# Patient Record
Sex: FEMALE | Race: ASIAN | NOT HISPANIC OR LATINO | Employment: FULL TIME | URBAN - METROPOLITAN AREA
[De-identification: names, ages, dates, MRNs, and addresses within clinical notes are randomized per-mention and may not be internally consistent; named-entity substitution may affect disease eponyms.]

---

## 2017-04-06 ENCOUNTER — TELEPHONE (OUTPATIENT)
Dept: OBGYN | Facility: CLINIC | Age: 27
End: 2017-04-06

## 2017-04-06 NOTE — TELEPHONE ENCOUNTER
Pt is past due for f/u pap smear.  Reminder letter was sent 03/21/17 and was returned.  Per consent to communicate, left detailed message to call and schedule at [facility] with interpretor assistance.  If no reply and/or appt within 2 weeks (04/20/17) pt will be considered lost to pap tracking f/u.  Sara Thurman,    Pap Tracking

## 2017-05-02 ENCOUNTER — OFFICE VISIT (OUTPATIENT)
Dept: PEDIATRICS | Facility: CLINIC | Age: 27
End: 2017-05-02
Payer: COMMERCIAL

## 2017-05-02 VITALS
SYSTOLIC BLOOD PRESSURE: 100 MMHG | DIASTOLIC BLOOD PRESSURE: 70 MMHG | OXYGEN SATURATION: 98 % | HEIGHT: 61 IN | WEIGHT: 83.1 LBS | TEMPERATURE: 97 F | HEART RATE: 67 BPM | BODY MASS INDEX: 15.69 KG/M2

## 2017-05-02 DIAGNOSIS — R09.81 CHRONIC NASAL CONGESTION: ICD-10-CM

## 2017-05-02 DIAGNOSIS — R63.4 UNINTENTIONAL WEIGHT LOSS: Primary | ICD-10-CM

## 2017-05-02 LAB
ALBUMIN SERPL-MCNC: 3.9 G/DL (ref 3.4–5)
ALBUMIN UR-MCNC: NEGATIVE MG/DL
ALP SERPL-CCNC: 86 U/L (ref 40–150)
ALT SERPL W P-5'-P-CCNC: 26 U/L (ref 0–50)
ANION GAP SERPL CALCULATED.3IONS-SCNC: 7 MMOL/L (ref 3–14)
APPEARANCE UR: CLEAR
AST SERPL W P-5'-P-CCNC: 13 U/L (ref 0–45)
BASOPHILS # BLD AUTO: 0 10E9/L (ref 0–0.2)
BASOPHILS NFR BLD AUTO: 0.8 %
BILIRUB SERPL-MCNC: 0.3 MG/DL (ref 0.2–1.3)
BILIRUB UR QL STRIP: NEGATIVE
BUN SERPL-MCNC: 14 MG/DL (ref 7–30)
CALCIUM SERPL-MCNC: 9 MG/DL (ref 8.5–10.1)
CHLORIDE SERPL-SCNC: 105 MMOL/L (ref 94–109)
CO2 SERPL-SCNC: 29 MMOL/L (ref 20–32)
COLOR UR AUTO: NORMAL
COPATH REPORT: NORMAL
CREAT SERPL-MCNC: 0.54 MG/DL (ref 0.52–1.04)
CRP SERPL-MCNC: <2.9 MG/L (ref 0–8)
DIFFERENTIAL METHOD BLD: ABNORMAL
EOSINOPHIL # BLD AUTO: 0.3 10E9/L (ref 0–0.7)
EOSINOPHIL NFR BLD AUTO: 6 %
ERYTHROCYTE [DISTWIDTH] IN BLOOD BY AUTOMATED COUNT: 15 % (ref 10–15)
ERYTHROCYTE [SEDIMENTATION RATE] IN BLOOD BY WESTERGREN METHOD: 15 MM/H (ref 0–20)
GFR SERPL CREATININE-BSD FRML MDRD: NORMAL ML/MIN/1.7M2
GLUCOSE SERPL-MCNC: 76 MG/DL (ref 70–99)
GLUCOSE UR STRIP-MCNC: NEGATIVE MG/DL
HCT VFR BLD AUTO: 37.1 % (ref 35–47)
HGB BLD-MCNC: 11.8 G/DL (ref 11.7–15.7)
HGB UR QL STRIP: NEGATIVE
KETONES UR STRIP-MCNC: NEGATIVE MG/DL
LEUKOCYTE ESTERASE UR QL STRIP: NEGATIVE
LYMPHOCYTES # BLD AUTO: 2.6 10E9/L (ref 0.8–5.3)
LYMPHOCYTES NFR BLD AUTO: 49 %
MCH RBC QN AUTO: 21.5 PG (ref 26.5–33)
MCHC RBC AUTO-ENTMCNC: 31.8 G/DL (ref 31.5–36.5)
MCV RBC AUTO: 68 FL (ref 78–100)
MONOCYTES # BLD AUTO: 0.3 10E9/L (ref 0–1.3)
MONOCYTES NFR BLD AUTO: 6 %
NEUTROPHILS # BLD AUTO: 2 10E9/L (ref 1.6–8.3)
NEUTROPHILS NFR BLD AUTO: 38.2 %
NITRATE UR QL: NEGATIVE
PH UR STRIP: 6.5 PH (ref 5–7)
PLATELET # BLD AUTO: 188 10E9/L (ref 150–450)
POTASSIUM SERPL-SCNC: 3.7 MMOL/L (ref 3.4–5.3)
PROT SERPL-MCNC: 7.6 G/DL (ref 6.8–8.8)
RBC # BLD AUTO: 5.48 10E12/L (ref 3.8–5.2)
RETICS # AUTO: 65.2 10E9/L (ref 25–95)
RETICS/RBC NFR AUTO: 1.2 % (ref 0.5–2)
SODIUM SERPL-SCNC: 141 MMOL/L (ref 133–144)
SP GR UR STRIP: 1.01 (ref 1–1.03)
TSH SERPL DL<=0.005 MIU/L-ACNC: 0.93 MU/L (ref 0.4–4)
URN SPEC COLLECT METH UR: NORMAL
UROBILINOGEN UR STRIP-MCNC: NORMAL MG/DL (ref 0–2)
WBC # BLD AUTO: 5.3 10E9/L (ref 4–11)

## 2017-05-02 PROCEDURE — 86003 ALLG SPEC IGE CRUDE XTRC EA: CPT | Performed by: FAMILY MEDICINE

## 2017-05-02 PROCEDURE — 84134 ASSAY OF PREALBUMIN: CPT | Performed by: FAMILY MEDICINE

## 2017-05-02 PROCEDURE — 85060 BLOOD SMEAR INTERPRETATION: CPT | Performed by: FAMILY MEDICINE

## 2017-05-02 PROCEDURE — 83516 IMMUNOASSAY NONANTIBODY: CPT | Performed by: FAMILY MEDICINE

## 2017-05-02 PROCEDURE — 99214 OFFICE O/P EST MOD 30 MIN: CPT | Performed by: FAMILY MEDICINE

## 2017-05-02 PROCEDURE — 80050 GENERAL HEALTH PANEL: CPT | Performed by: FAMILY MEDICINE

## 2017-05-02 PROCEDURE — 85652 RBC SED RATE AUTOMATED: CPT | Performed by: FAMILY MEDICINE

## 2017-05-02 PROCEDURE — 85045 AUTOMATED RETICULOCYTE COUNT: CPT | Performed by: FAMILY MEDICINE

## 2017-05-02 PROCEDURE — 86140 C-REACTIVE PROTEIN: CPT | Performed by: FAMILY MEDICINE

## 2017-05-02 PROCEDURE — 83516 IMMUNOASSAY NONANTIBODY: CPT | Mod: 59 | Performed by: FAMILY MEDICINE

## 2017-05-02 PROCEDURE — 36415 COLL VENOUS BLD VENIPUNCTURE: CPT | Performed by: FAMILY MEDICINE

## 2017-05-02 PROCEDURE — 81003 URINALYSIS AUTO W/O SCOPE: CPT | Performed by: FAMILY MEDICINE

## 2017-05-02 ASSESSMENT — PAIN SCALES - GENERAL: PAINLEVEL: NO PAIN (0)

## 2017-05-02 NOTE — NURSING NOTE
"Chief Complaint   Patient presents with     RECHECK     Sinus Problem       Initial /70  Pulse 67  Temp 97  F (36.1  C) (Oral)  Ht 5' 1\" (1.549 m)  Wt 83 lb 1.6 oz (37.7 kg)  SpO2 98%  BMI 15.7 kg/m2 Estimated body mass index is 15.7 kg/(m^2) as calculated from the following:    Height as of this encounter: 5' 1\" (1.549 m).    Weight as of this encounter: 83 lb 1.6 oz (37.7 kg).  BP completed using cuff size: pediatric  Ellis Boland, AVELINO    "

## 2017-05-02 NOTE — MR AVS SNAPSHOT
After Visit Summary   5/2/2017    Maria Del Rosario Moreno    MRN: [id number]           Patient Information     Date Of Birth          1990        Visit Information        Provider Department      5/2/2017 9:45 AM Evette Storm MD; [employer] [facility]        Today's Diagnoses     Unintentional weight loss    -  1    Chronic nasal congestion          Care Instructions    Schedule for sinus CT in 1-2 days  Get the labs today        Follow-ups after your visit        Future tests that were ordered for you today     Open Future Orders        Priority Expected Expires Ordered    CT Maxillofacial w/o Contrast Routine  5/2/2018 5/2/2017            Who to contact     If you have questions or need follow up information about today's clinic visit or your schedule please contact [facility] directly at [phone].  Normal or non-critical lab and imaging results will be communicated to you by MyChart, letter or phone within 4 business days after the clinic has received the results. If you do not hear from us within 7 days, please contact the clinic through MyChart or phone. If you have a critical or abnormal lab result, we will notify you by phone as soon as possible.  Submit refill requests through [employer] or call your pharmacy and they will forward the refill request to us. Please allow 3 business days for your refill to be completed.          Additional Information About Your Visit        [employer] Information     [employer] is an electronic gateway that provides easy, online access to your medical records. With [employer], you can request a clinic appointment, read your test results, renew a prescription or communicate with your care team.     To sign up for [employer] visit the website at [email or web address]   You will be asked to enter the access code listed below, as well as some personal information. Please follow the directions to create your username  "and password.     Your access code is: [email or web address]  Expires: 2017 10:36 AM     Your access code will  in 90 days. If you need help or a new code, please contact your [facility] or call [phone] for assistance.        Care EveryWhere ID     This is your Care EveryWhere ID. This could be used by other organizations to access your [facility] medical records  [id number]        Your Vitals Were     Pulse Temperature Height Pulse Oximetry BMI (Body Mass Index)       67 97  F (36.1  C) (Oral) 5' 1\" (1.549 m) 98% 15.7 kg/m2        Blood Pressure from Last 3 Encounters:   17 100/70   16 116/70   16 92/61    Weight from Last 3 Encounters:   17 83 lb 1.6 oz (37.7 kg)   10/25/16 86 lb 11.2 oz (39.3 kg)   16 84 lb 11.2 oz (38.4 kg)              We Performed the Following     *UA reflex to Microscopic and Culture ([place]; [facility]; [facility]; [facility]; [facility]; [facility]; [place]; [employer])     Allergy adult food panel     Blood Morphology Pathologist Review     CBC with platelets and differential     Comprehensive metabolic panel     CRP, inflammation     ESR: Erythrocyte sedimentation rate     Prealbumin     Reticulocyte Count     Tissue transglutaminase ana rosa IgA and IgG     TSH with free T4 reflex        Primary Care Provider    None Doctor, MD       No address on file        Thank you!     Thank you for choosing [facility]  for your care. Our goal is always to provide you with excellent care. Hearing back from our patients is one way we can continue to improve our services. Please take a few minutes to complete the written survey that you may receive in the mail after your visit with us. Thank you!             Your Updated Medication List - Protect others around you: Learn how to safely use, store and throw away your medicines at www.disposemymeds.org.          This list is accurate as of: 17 10:36 " AM.  Always use your most recent med list.                   Brand Name Dispense Instructions for use    levonorgestrel 20 MCG/24HR IUD    MIRENA    1 each    1 each (20 mcg) by Intrauterine route once for 1 dose NDC #21700-489-61 Lot #[id number] Exp 08/18       prenatal multivitamin  plus iron 27-0.8 MG Tabs per tablet     100 tablet    Take 1 tablet by mouth daily       VITAMIN C PO

## 2017-05-02 NOTE — PROGRESS NOTES
SUBJECTIVE:                                                    Maria Del Rosario Moreno is a 27 year old female who presents to clinic today for the following health issues:    Recheck-Follow up unintentional weight loss from 16  Patient is here today with a Lithuanian   This patient was seen 7 months ago as a new patient for nasal allergies and unintentional weight loss. Patient continues to have weight loss despite dietary changes per dietitian consult.  Patient denies concerns for fever, chills, stressors, anxiety, abdominal pain, cough, night sweats, abnormal skin rashes, bone or joint pains, fatigue. She sleeps well, she is happily , she is  2 para 2, using Mirena IUD for contraception.  Patient denies family history of malignancies  Past medical history significant for alpha thalassemia trait but otherwise normal  She denies decrease or  lack of appetite,Hair loss      RESPIRATORY SYMPTOMS  Patient was seen 5 months ago, was recommended to start using Flonase nasal spray and over-the-counter antihistamines for her ongoing nasal congestion and allergies patient did not notice any significant improvement of symptoms despite doing the medications  She continues to have significant stuffy nose,Mucous rhinorrhea, sneezing,Postnasal drainage, itchy eyes with no associated fever, chills, sore throat, cough, wheezing  Patient does not smoke,      Duration: 5 months    Description  nasal congestion, rhinorrhea, sneezing and itching eyes    Severity: moderate    Accompanying signs and symptoms: None    History (predisposing factors):  none    Precipitating or alleviating factors: None    Therapies tried and outcome:  Zyrtec daily for a few weeks-no change in symptoms           Problem list and histories reviewed & adjusted, as indicated.  Additional history: as documented    Patient Active Problem List   Diagnosis     Papanicolaou smear of cervix with low grade squamous intraepithelial lesion (LGSIL)      Alpha thalassemia trait     Need for Tdap vaccination     Unintentional weight loss     Past Surgical History:   Procedure Laterality Date     NO HISTORY OF SURGERY         Social History   Substance Use Topics     Smoking status: Never Smoker     Smokeless tobacco: Never Used     Alcohol use No     Family History   Problem Relation Age of Onset     DIABETES Father      d/c'd age 52     Cardiovascular Mother          Current Outpatient Prescriptions   Medication Sig Dispense Refill     Prenatal Vit-Fe Fumarate-FA (PRENATAL MULTIVITAMIN  PLUS IRON) 27-0.8 MG TABS Take 1 tablet by mouth daily 100 tablet 3     Ascorbic Acid (VITAMIN C PO)        levonorgestrel (MIRENA) 20 MCG/24HR IUD 1 each (20 mcg) by Intrauterine route once for 1 dose NDC #62294-739-15  Lot #[id number]  Exp 08/18 1 each 0     No Known Allergies  Recent Labs   Lab Test  05/02/17   1041  09/30/16   1115   ALT  26  31   CR  0.54  0.83   GFRESTIMATED  >90  Non  GFR Calc    84   GFRESTBLACK  >90   GFR Calc    >90   GFR Calc     POTASSIUM  3.7  4.1   TSH  0.93  0.92      BP Readings from Last 3 Encounters:   05/02/17 100/70   09/30/16 116/70   09/21/16 92/61    Wt Readings from Last 3 Encounters:   05/02/17 83 lb 1.6 oz (37.7 kg)   10/25/16 86 lb 11.2 oz (39.3 kg)   09/30/16 84 lb 11.2 oz (38.4 kg)                  Labs reviewed in EPIC    Reviewed and updated as needed this visit by clinical staff  Tobacco  Allergies  Meds  Med Hx  Surg Hx  Fam Hx  Soc Hx      Reviewed and updated as needed this visit by Provider         ROS:  CONSTITUTIONAL:as above  INTEGUMENTARY/SKIN: NEGATIVE for worrisome rashes, moles or lesions  ENT/MOUTH: as above  R: NEGATIVE for significant cough or SOB  CV: NEGATIVE for chest pain, palpitations or peripheral edema  GI: NEGATIVE for nausea, abdominal pain, heartburn, or change in bowel habits  : normal menstrual cycles  MUSCULOSKELETAL: NEGATIVE for significant  "arthralgias or myalgia  NEURO: NEGATIVE for weakness, dizziness or paresthesias  ENDOCRINE: NEGATIVE for temperature intolerance, skin/hair changes  HEME/ALLERGY/IMMUNE: NEGATIVE for bleeding problems  PSYCHIATRIC: NEGATIVE for changes in mood or affect    OBJECTIVE:                                                    /70  Pulse 67  Temp 97  F (36.1  C) (Oral)  Ht 5' 1\" (1.549 m)  Wt 83 lb 1.6 oz (37.7 kg)  SpO2 98%  BMI 15.7 kg/m2  Body mass index is 15.7 kg/(m^2).  GENERAL: healthy, alert, no distress and Thin built  EYES: Eyes grossly normal to inspection  HENT: normal cephalic/atraumatic, ear canals and TM's normal, nasal mucosa edematous , oropharynx clear, oral mucous membranes moist and sinuses: not tender  NECK: no adenopathy, no asymmetry, masses, or scars and thyroid normal to palpation  RESP: lungs clear to auscultation - no rales, rhonchi or wheezes  CV: regular rate and rhythm, normal S1 S2, no S3 or S4, no murmur, click or rub, no peripheral edema and peripheral pulses strong  ABDOMEN: soft, nontender, no hepatosplenomegaly, no masses and bowel sounds normal  MS: no gross musculoskeletal defects noted, no edema  SKIN: no suspicious lesions or rashes  NEURO: Normal strength and tone, mentation intact and speech normal  PSYCH: mentation appears normal, affect normal/bright  LYMPH: no cervical, supraclavicular, axillary, or inguinal adenopathy    Diagnostic Test Results:  none      ASSESSMENT/PLAN:                                                            1. Unintentional weight loss  Wt Readings from Last 5 Encounters:   05/02/17 83 lb 1.6 oz (37.7 kg)   10/25/16 86 lb 11.2 oz (39.3 kg)   09/30/16 84 lb 11.2 oz (38.4 kg)   09/21/16 81 lb 6.4 oz (36.9 kg)   02/04/16 88 lb 12.8 oz (40.3 kg)     Though there is not much a significant weight loss since her last visit with us, she did not gain any weight either  Patient had negative initial evaluation in September 2016  We'll recheck labs today " along with a peripheral smear, food allergy panel and celiac disease screen and checking for prealbumin levels to check for nutrition status  Will f/u on results and call with recommendations.    - CBC with platelets and differential  - Reticulocyte Count  - Blood Morphology Pathologist Review  - CRP, inflammation  - ESR: Erythrocyte sedimentation rate  - TSH with free T4 reflex  - Comprehensive metabolic panel  - Tissue transglutaminase ana rosa IgA and IgG  - Allergy adult food panel  - Prealbumin  - *UA reflex to Microscopic and Culture ([place]; [facility]; [facility]; [facility]; [facility]; [facility]; [place]; [place])    2. Chronic nasal congestion  Due to chronicity of symptoms and no response with current treatment, we'll proceed with a CT for further evaluation  Will f/u on results and call with recommendations.    - CT Maxillofacial w/o Contrast; Future    Chart documentation done in part with Dragon Voice recognition Software. Although reviewed after completion, some word and grammatical error may remain.    See Patient Instructions    Evette Storm MD  [facility]

## 2017-05-03 LAB
CLAM IGE QN: NORMAL KU(A)/L
CODFISH IGE QN: NORMAL KU(A)/L
CORN IGE QN: NORMAL KU(A)/L
COW MILK IGE QN: NORMAL KU/L
EGG WHITE IGE QN: NORMAL KU(A)/L
PEANUT IGE QN: NORMAL KU(A)/L
PREALB SERPL IA-MCNC: 28 MG/DL (ref 15–45)
SCALLOP IGE QN: NORMAL KU(A)/L
SHRIMP IGE QN: NORMAL KU(A)/L
SOYBEAN IGE QN: NORMAL KU(A)/L
WALNUT IGE QN: NORMAL KU(A)/L
WHEAT IGE QN: NORMAL KU(A)/L

## 2017-05-04 ENCOUNTER — TELEPHONE (OUTPATIENT)
Dept: PEDIATRICS | Facility: CLINIC | Age: 27
End: 2017-05-04

## 2017-05-04 LAB
TTG IGA SER-ACNC: NORMAL U/ML
TTG IGG SER-ACNC: 1 U/ML

## 2017-05-04 NOTE — TELEPHONE ENCOUNTER
Evette Storm MD 11 minutes ago (11:22 AM)                 Please inform patient of nor al labs for hemoglobin, peripheral smear, liver, kidney and tyroid functions.  No major food allergy noted.  Her weight loss could be functional, we will continue with high calorie diet, f/u in 3 months for weight check or sooner if needed  No further testing indicated at this time     Call placed to patient and results and recommendations reviewed in detail.  Patient did not have questions.  Encouraged patient to call with any concerns if any should arise.

## 2017-05-04 NOTE — TELEPHONE ENCOUNTER
Please inform patient of nor al labs for hemoglobin, peripheral smear, liver, kidney and tyroid functions.  No major food allergy noted.  Her weight loss could be functional, we will continue with high calorie diet, f/u in 3 months for weight check or sooner if needed  No further testing indicated at this time

## 2017-08-02 ENCOUNTER — OFFICE VISIT (OUTPATIENT)
Dept: PEDIATRICS | Facility: CLINIC | Age: 27
End: 2017-08-02
Payer: COMMERCIAL

## 2017-08-02 VITALS
DIASTOLIC BLOOD PRESSURE: 60 MMHG | TEMPERATURE: 98.8 F | HEART RATE: 71 BPM | WEIGHT: 88.1 LBS | SYSTOLIC BLOOD PRESSURE: 98 MMHG | HEIGHT: 61 IN | BODY MASS INDEX: 16.63 KG/M2 | OXYGEN SATURATION: 100 %

## 2017-08-02 DIAGNOSIS — Z30.431 ENCOUNTER FOR MANAGEMENT OF INTRAUTERINE CONTRACEPTIVE DEVICE (IUD), UNSPECIFIED IUD MANAGEMENT TYPE: ICD-10-CM

## 2017-08-02 DIAGNOSIS — R63.6 UNDERWEIGHT: Primary | ICD-10-CM

## 2017-08-02 DIAGNOSIS — R30.0 DYSURIA: ICD-10-CM

## 2017-08-02 DIAGNOSIS — R63.4 UNINTENTIONAL WEIGHT LOSS: ICD-10-CM

## 2017-08-02 LAB
ALBUMIN UR-MCNC: NEGATIVE MG/DL
APPEARANCE UR: CLEAR
BILIRUB UR QL STRIP: NEGATIVE
COLOR UR AUTO: ABNORMAL
GLUCOSE UR STRIP-MCNC: NEGATIVE MG/DL
HGB UR QL STRIP: NEGATIVE
KETONES UR STRIP-MCNC: NEGATIVE MG/DL
LEUKOCYTE ESTERASE UR QL STRIP: NEGATIVE
NITRATE UR QL: NEGATIVE
PH UR STRIP: 7.5 PH (ref 5–7)
SP GR UR STRIP: 1 (ref 1–1.03)
URN SPEC COLLECT METH UR: ABNORMAL
UROBILINOGEN UR STRIP-MCNC: NORMAL MG/DL (ref 0–2)

## 2017-08-02 PROCEDURE — 99214 OFFICE O/P EST MOD 30 MIN: CPT | Performed by: FAMILY MEDICINE

## 2017-08-02 PROCEDURE — 81003 URINALYSIS AUTO W/O SCOPE: CPT | Performed by: FAMILY MEDICINE

## 2017-08-02 NOTE — PROGRESS NOTES
SUBJECTIVE:                                                    Maria Del Rosario Moreno is a 27 year old female who presents to clinic today for the following health issues:      Recheck: Follow up on unintentional weight loss 16  Patient is here with a Barbadian  for follow-up on her unintentional weight loss for which she was seen in 2016  Patient denies concerns for decrease her lack of appetite, fever, chills, night sweats, cough, shortness of breath, chest pain, abdominal pain, nausea, vomiting, diarrhea, constipation  She is still not able to gain weight as expected   patient is  2 para 2, last childbirth-3.5 years ago  She denies abnormal menstrual bleeding,   She denies concerns for depression, anxiety  Had Mirena IUD in summer 2015 after which she has started noticing the weight loss    URINARY TRACT SYMPTOMS  Also patient is complaining of mild burning urination that started 10 days ago, had slight improvement in symptoms since yesterday  Has no history of recurrent UTI, current concerns for fever, chills, nausea, vomiting, lower abdominal or pelvic pain, abnormal vaginal discharge,flank or lower back pain    Onset: 10 days    Description:   Painful urination (Dysuria): YES  Blood in urine (Hematuria): no   Delay in urine (Hesitency): no     Intensity: mild    Progression of Symptoms:  intermittent    Accompanying Signs & Symptoms:  Fever/chills: no   Flank pain no   Nausea and vomiting: no   Any vaginal symptoms: none  Abdominal/Pelvic Pain: no     History:   History of frequent UTI's: no   History of kidney stones: no   Sexually Active: YES  Possibility of pregnancy: No    Precipitating factors:   none    Therapies Tried and outcome: none      Problem list and histories reviewed & adjusted, as indicated.  Additional history: as documented    Patient Active Problem List   Diagnosis     Papanicolaou smear of cervix with low grade squamous intraepithelial lesion (LGSIL)     Alpha  thalassemia trait     Need for Tdap vaccination     Unintentional weight loss     Chronic nasal congestion     Past Surgical History:   Procedure Laterality Date     NO HISTORY OF SURGERY         Social History   Substance Use Topics     Smoking status: Never Smoker     Smokeless tobacco: Never Used     Alcohol use No     Family History   Problem Relation Age of Onset     DIABETES Father      d/c'd age 52     Cardiovascular Mother          Current Outpatient Prescriptions   Medication Sig Dispense Refill     Prenatal Vit-Fe Fumarate-FA (PRENATAL MULTIVITAMIN  PLUS IRON) 27-0.8 MG TABS Take 1 tablet by mouth daily 100 tablet 3     Ascorbic Acid (VITAMIN C PO)        levonorgestrel (MIRENA) 20 MCG/24HR IUD 1 each (20 mcg) by Intrauterine route once for 1 dose NDC #56164-839-19  Lot #[id number]  Exp 08/18 1 each 0     No Known Allergies  Recent Labs   Lab Test  05/02/17   1041  09/30/16   1115   ALT  26  31   CR  0.54  0.83   GFRESTIMATED  >90  Non  GFR Calc    84   GFRESTBLACK  >90   GFR Calc    >90   GFR Calc     POTASSIUM  3.7  4.1   TSH  0.93  0.92      BP Readings from Last 3 Encounters:   08/02/17 98/60   05/02/17 100/70   09/30/16 116/70    Wt Readings from Last 3 Encounters:   08/02/17 88 lb 1.6 oz (40 kg)   05/02/17 83 lb 1.6 oz (37.7 kg)   10/25/16 86 lb 11.2 oz (39.3 kg)                  Labs reviewed in EPIC          Reviewed and updated as needed this visit by clinical staffTobacco  Allergies  Med Hx  Surg Hx  Fam Hx  Soc Hx      Reviewed and updated as needed this visit by Provider         ROS:  CONSTITUTIONAL:as above  INTEGUMENTARY/SKIN: NEGATIVE for worrisome rashes, moles or lesions  E/M: NEGATIVE for ear, mouth and throat problems  R: NEGATIVE for significant cough or SOB  CV: NEGATIVE for chest pain, palpitations or peripheral edema  GI: NEGATIVE for nausea, abdominal pain, heartburn, or change in bowel habits  : as above  MUSCULOSKELETAL: NEGATIVE  "for significant arthralgias or myalgia  NEURO: NEGATIVE for weakness, dizziness or paresthesias  ENDOCRINE: NEGATIVE for temperature intolerance, skin/hair changes  HEME/ALLERGY/IMMUNE: NEGATIVE for bleeding problems  PSYCHIATRIC: NEGATIVE for changes in mood or affect    OBJECTIVE:     BP 98/60  Pulse 71  Temp 98.8  F (37.1  C) (Temporal)  Ht 5' 1\" (1.549 m)  Wt 88 lb 1.6 oz (40 kg)  SpO2 100%  BMI 16.65 kg/m2  Body mass index is 16.65 kg/(m^2).  GENERAL: healthy, alert and no distress  NECK: no adenopathy, no asymmetry, masses, or scars and thyroid normal to palpation  RESP: lungs clear to auscultation - no rales, rhonchi or wheezes  CV: regular rate and rhythm, normal S1 S2, no S3 or S4, no murmur, click or rub, no peripheral edema and peripheral pulses strong  ABDOMEN: soft, nontender, no hepatosplenomegaly, no masses and bowel sounds normal  MS: no gross musculoskeletal defects noted, no edema  SKIN: no suspicious lesions or rashes  NEURO: Normal strength and tone, mentation intact and speech normal  BACK: no CVA tenderness, no paralumbar tenderness  PSYCH: mentation appears normal, affect normal/bright    Diagnostic Test Results:  none     ASSESSMENT/PLAN:             1. Underweight  Wt Readings from Last 5 Encounters:   08/02/17 88 lb 1.6 oz (40 kg)   05/02/17 83 lb 1.6 oz (37.7 kg)   10/25/16 86 lb 11.2 oz (39.3 kg)   09/30/16 84 lb 11.2 oz (38.4 kg)   09/21/16 81 lb 6.4 oz (36.9 kg)       Recommended patient to consult nutrition regarding dietary recommendations for weight gain if patient does not notice any improvement in 4-6 weeks,   will follow up in the clinic to have her IUD removed and start on oral hormonal therapy for contraception to see if that was the trigger for her unintentional weight loss  Reassured patient that all her initial evaluation from her last visit was negative and I do not think she needs repeat labs or other workup at this time  Patient verbalised understanding and is " agreeable to the plan.    - NUTRITION REFERRAL    2. Dysuria  ddx-Rule out cystitis/UTI  - *UA reflex to Microscopic and Culture ([place]; [facility]; [facility]; [facility]; [facility]; [facility]; [place]; [place])    3. Unintentional weight loss  as above    - NUTRITION REFERRAL    4. Encounter for management of intrauterine contraceptive device (IUD), unspecified IUD management type  Per patient's report, her weight loss started after having IUD inserted in December 2015  Patient will follow-up with the plan as mentioned above        Chart documentation done in part with Dragon Voice recognition Software. Although reviewed after completion, some word and grammatical error may remain.    See Patient Instructions    Evette Storm MD  [facility]

## 2017-08-02 NOTE — MR AVS SNAPSHOT
"              After Visit Summary   8/2/2017    Maria Del Rosario Moreno    MRN: [id number]           Patient Information     Date Of Birth          1990        Visit Information        Provider Department      8/2/2017 3:30 PM Evette Storm MD; [facility]        Today's Diagnoses     Underweight    -  1    Dysuria        Unintentional weight loss        Encounter for management of intrauterine contraceptive device (IUD), unspecified IUD management type          Care Instructions    Push fluids  Schedule for dietician consult  Follow up in the clinic for IUD removal if no weight gain after 2 months      Tìm cân n?ng lý t??ng c?a quý v?  H?u h?t m?i ng??i trên t?p chí và TV ??u trông thon g?n h?n shaheed?u so v?i m?c jamaal bình, d?u v?y, ?ây là m?c \"lý t??ng\" mà shaheed?u ng??i h??ng t?i. Tr??c khi mark?t ??nh r?ng quý v? s? không vui cho t?i khi mình ??t t?i m?t m?c cân n?ng nh?t ??nh, hãy xem xét nh?ng ?i?u áu:      Tu?i c?a quý v?. Có th? quý v? ??c mình có th? aravind l?i cân n?ng h?i còn h?c ??i h?c. Nh?ng nh?ng thay ??i bình th??ng john quá trình trao ??i ch?t và m?c hoocmon di?n ra khi con ng??i già ?i khi?n m? ??c ?ó không th?c t?.    Gi?i tính c?a quý v?. Nhìn bhatia, nam gi?i có shaheed?u c? và x??ng n?ng h?n n?, ?i?u này có ngh?a r?ng nam gi?i kh?e m?nh th??ng n?ng h?n n? gi?i kh?e m?nh có cùng chi?u lake.    Cân n?ng hi?n t?i c?a quý v?. N?u quý v? r?t n?ng, hãy t?p jamaal gi?m m?t ít cân n?ng (ch?ng h?n nh? 10 ph?n tr?m cân n?ng c? th? c?a quý v?). Ch? gi?m 5-10 cân là có th? c?i thi?n s?c kh?e c?a quý v?.  T? l? m? trên c? th? c?a quý v?  M?t cân c? có cùng cân n?ng nh? m?t cân m?, nh?ng nó chi?m ít th? tích h?n. Hãy ngh? ??n m?t v?n ??ng viên jovi ?ào t?o và m?t \"ng??i l??i bi?ng\". M?c dù h? có th? có cùng cân n?ng và chi?u lake nh?ng v?n ??ng viên trông kh?e h?n, thon g?n h?n và có th? m?c kích c? qu?n áo nh? h?n. N?u quý v? là m?t ng??i c? b?p, ki?m tra m? c? th? có th? là " th??c ?o chính xác h?n v? cân n?ng lý t??ng c?a quý v? so v?i cân cân ?o john bu?ng t?m. Lakshmi chinchillay?n v?i nhân viên y t? c?a quý v?, h? có th? giúp quý v? ??t các m?c tiêu phù h?p cho quý v?.  Date Last Reviewed: 6/1/2015 2000-2017 [employer]. [address] [place] [address]. All rights reserved. This information is not intended as a substitute for professional medical care. Always follow your healthcare professional's instructions.                Follow-ups after your visit        Additional Services     NUTRITION REFERRAL       Your provider has referred you to: [facility]: [facility] [phone]   [email or web address] [email or web address]    Please be aware that coverage of these services is subject to the terms and limitations of your health insurance plan.  Call member services at your health plan with any benefit or coverage questions.      Please bring the following to your appointment:      >>   This referral request   >>   Any documents given to you for this referral  >>   Any specific questions you have about diet or food choices                  Who to contact     If you have questions or need follow up information about today's clinic visit or your schedule please contact [facility] directly at [phone].  Normal or non-critical lab and imaging results will be communicated to you by Apptiohart, letter or phone within 4 business days after the clinic has received the results. If you do not hear from us within 7 days, please contact the clinic through Apptiohart or phone. If you have a critical or abnormal lab result, we will notify you by phone as soon as possible.  Submit refill requests through [employer] or call your pharmacy and they will forward the refill request to us. Please allow 3 business days for your refill to be completed.          Additional Information About Your Visit        MyChart Information     Mark is an  "electronic gateway that provides easy, online access to your medical records. With Th[employer] Reading, you can request a clinic appointment, read your test results, renew a prescription or communicate with your care team.     To sign up for [employer] Reading visit the website at [email or web address]   You will be asked to enter the access code listed below, as well as some personal information. Please follow the directions to create your username and password.     Your access code is: [email or web address]  Expires: 10/31/2017  4:22 PM     Your access code will  in 90 days. If you need help or a new code, please contact your [facility] or call [phone] for assistance.        Care EveryWhere ID     This is your Care EveryWhere ID. This could be used by other organizations to access your [place] medical records  [id number]        Your Vitals Were     Pulse Temperature Height Pulse Oximetry BMI (Body Mass Index)       71 98.8  F (37.1  C) (Temporal) 5' 1\" (1.549 m) 100% 16.65 kg/m2        Blood Pressure from Last 3 Encounters:   17 98/60   17 100/70   16 116/70    Weight from Last 3 Encounters:   17 88 lb 1.6 oz (40 kg)   17 83 lb 1.6 oz (37.7 kg)   10/25/16 86 lb 11.2 oz (39.3 kg)              We Performed the Following     *UA reflex to Microscopic and Culture ([place]; [facility]; [facility]; [facility]; [facility]; [facility]; [place]; [place])     NUTRITION REFERRAL        Primary Care Provider Office Phone # Fax #    Evette Storm [phone]       [facility] [address]  [facility] [address]        Equal Access to Services     TRISHA LANDIS : Teddy Connors, roosevelt barber, qaaleja cid. [facility] [phone].    ATENCIÓN: Si habla español, tiene a meade disposición servicios gratuitos de asistencia lingüística. Llame al " [phone].    We comply with applicable federal civil rights laws and [place] laws. We do not discriminate on the basis of race, color, national origin, age, disability sex, sexual orientation or gender identity.            Thank you!     Thank you for choosing [facility]  for your care. Our goal is always to provide you with excellent care. Hearing back from our patients is one way we can continue to improve our services. Please take a few minutes to complete the written survey that you may receive in the mail after your visit with us. Thank you!             Your Updated Medication List - Protect others around you: Learn how to safely use, store and throw away your medicines at www.disposemymeds.org.          This list is accurate as of: 8/2/17  4:22 PM.  Always use your most recent med list.                   Brand Name Dispense Instructions for use Diagnosis    levonorgestrel 20 MCG/24HR IUD    MIRENA    1 each    1 each (20 mcg) by Intrauterine route once for 1 dose NDC #74832-232-34 Lot #[id number] Exp 08/18    Encounter for IUD insertion       prenatal multivitamin  plus iron 27-0.8 MG Tabs per tablet     100 tablet    Take 1 tablet by mouth daily    Routine postpartum follow-up, Unintentional weight loss       VITAMIN C PO

## 2017-08-02 NOTE — NURSING NOTE
"Chief Complaint   Patient presents with     RECHECK       Initial BP 98/60  Pulse 71  Temp 98.8  F (37.1  C) (Temporal)  Ht 5' 1\" (1.549 m)  Wt 88 lb 1.6 oz (40 kg)  SpO2 100%  BMI 16.65 kg/m2 Estimated body mass index is 16.65 kg/(m^2) as calculated from the following:    Height as of this encounter: 5' 1\" (1.549 m).    Weight as of this encounter: 88 lb 1.6 oz (40 kg).  Medication Reconciliation: complete     Susan Higuera CMA  "

## 2017-08-02 NOTE — PATIENT INSTRUCTIONS
"Push fluids  Schedule for dietician consult  Follow up in the clinic for IUD removal if no weight gain after 2 months      Tìm cân n?ng lý t??ng c?a quý v?  H?u h?t m?i ng??i trên t?p chí và TV ??u trông thon g?n h?n shaheed?u so v?i m?c jamaal bình, d?u v?y, ?ây là m?c \"lý t??ng\" mà shaheed?u ng??i h??ng t?i. Tr??c khi mark?t ??nh r?ng quý v? s? không vui cho t?i khi mình ??t t?i m?t m?c cân n?ng nh?t ??nh, hãy xem xét nh?ng ?i?u áu:      Tu?i c?a quý v?. Có th? quý v? ??c mình có th? aravind l?i cân n?ng h?i còn h?c ??i h?c. Nh?ng nh?ng thay ??i bình th??ng john quá trình trao ??i ch?t và m?c hoocmon di?n ra khi con ng??i già ?i khi?n m? ??c ?ó không th?c t?.    Gi?i tính c?a quý v?. Nhìn bhatia, nam gi?i có shaheed?u c? và x??ng n?ng h?n n?, ?i?u này có ngh?a r?ng nam gi?i kh?e m?nh th??ng n?ng h?n n? gi?i kh?e m?nh có cùng chi?u lake.    Cân n?ng hi?n t?i c?a quý v?. N?u quý v? r?t n?ng, hãy t?p jaamal gi?m m?t ít cân n?ng (ch?ng h?n nh? 10 ph?n tr?m cân n?ng c? th? c?a quý v?). Ch? gi?m 5-10 cân là có th? c?i thi?n s?c kh?e c?a quý v?.  T? l? m? trên c? th? c?a quý v?  M?t cân c? có cùng cân n?ng nh? m?t cân m?, nh?ng nó chi?m ít th? tích h?n. Hãy ngh? ??n m?t v?n ??ng viên jovi ?ào t?o và m?t \"ng??i l??i bi?ng\". M?c dù h? có th? có cùng cân n?ng và chi?u [place] nh?ng v?n ??ng viên trông kh?e h?n, thon g?n h?n và có th? m?c kích c? qu?n áo nh? h?n. N?u quý v? là m?t ng??i c? b?p, ki?m tra m? c? th? có th? là th??c ?o chính xác h?n v? cân n?ng lý t??ng c?a quý v? so v?i cân cân ?o john bu?ng t?m. Hãy nói david?n v?i nhân viên y t? c?a quý v?, h? có th? giúp quý v? ??t các m?c tiêu phù h?p cho quý v?.  Date Last Reviewed: 6/1/2015 2000-2017 [employer]. [address] [place] [address], [place] [address]. All rights reserved. This information is not intended as a substitute for professional medical care. Always follow your healthcare professional's instructions.        "

## 2017-10-25 ENCOUNTER — OFFICE VISIT (OUTPATIENT)
Dept: PEDIATRICS | Facility: CLINIC | Age: 27
End: 2017-10-25
Payer: COMMERCIAL

## 2017-10-25 VITALS
SYSTOLIC BLOOD PRESSURE: 104 MMHG | DIASTOLIC BLOOD PRESSURE: 64 MMHG | BODY MASS INDEX: 16.61 KG/M2 | HEART RATE: 68 BPM | WEIGHT: 87.9 LBS | OXYGEN SATURATION: 98 % | TEMPERATURE: 97.1 F

## 2017-10-25 DIAGNOSIS — N64.4 MASTODYNIA OF RIGHT BREAST: ICD-10-CM

## 2017-10-25 DIAGNOSIS — Z01.818 PREOP GENERAL PHYSICAL EXAM: Primary | ICD-10-CM

## 2017-10-25 DIAGNOSIS — N64.89 SMALL BREAST: ICD-10-CM

## 2017-10-25 LAB
BASOPHILS # BLD AUTO: 0.1 10E9/L (ref 0–0.2)
BASOPHILS NFR BLD AUTO: 2 %
DIFFERENTIAL METHOD BLD: ABNORMAL
EOSINOPHIL # BLD AUTO: 0.4 10E9/L (ref 0–0.7)
EOSINOPHIL NFR BLD AUTO: 8 %
ERYTHROCYTE [DISTWIDTH] IN BLOOD BY AUTOMATED COUNT: 15.1 % (ref 10–15)
HCT VFR BLD AUTO: 37.7 % (ref 35–47)
HGB BLD-MCNC: 11.9 G/DL (ref 11.7–15.7)
LYMPHOCYTES # BLD AUTO: 2.6 10E9/L (ref 0.8–5.3)
LYMPHOCYTES NFR BLD AUTO: 51 %
MCH RBC QN AUTO: 21.4 PG (ref 26.5–33)
MCHC RBC AUTO-ENTMCNC: 31.6 G/DL (ref 31.5–36.5)
MCV RBC AUTO: 68 FL (ref 78–100)
MICROCYTES BLD QL SMEAR: PRESENT
MONOCYTES # BLD AUTO: 0.3 10E9/L (ref 0–1.3)
MONOCYTES NFR BLD AUTO: 5 %
NEUTROPHILS # BLD AUTO: 1.8 10E9/L (ref 1.6–8.3)
NEUTROPHILS NFR BLD AUTO: 34 %
PLATELET # BLD AUTO: 171 10E9/L (ref 150–450)
PLATELET # BLD EST: NORMAL 10*3/UL
RBC # BLD AUTO: 5.57 10E12/L (ref 3.8–5.2)
WBC # BLD AUTO: 5.2 10E9/L (ref 4–11)

## 2017-10-25 PROCEDURE — 36415 COLL VENOUS BLD VENIPUNCTURE: CPT | Performed by: FAMILY MEDICINE

## 2017-10-25 PROCEDURE — 85025 COMPLETE CBC W/AUTO DIFF WBC: CPT | Performed by: FAMILY MEDICINE

## 2017-10-25 PROCEDURE — 99214 OFFICE O/P EST MOD 30 MIN: CPT | Performed by: FAMILY MEDICINE

## 2017-10-25 ASSESSMENT — PAIN SCALES - GENERAL: PAINLEVEL: NO PAIN (0)

## 2017-10-25 NOTE — PROGRESS NOTES
[address] [address]  [phone]  Dept: [phone]    PRE-OP EVALUATION:  Today's date: 10/25/2017    Maria Del Rosario Moreno (: 1990) presents for pre-operative evaluation assessment as requested by Dr. Josr Low.  She requires evaluation and anesthesia risk assessment prior to undergoing surgery/procedure for treatment of breast implants.  Proposed procedure: Breast implants    Date of Surgery/ Procedure: 11/3/17  Time of Surgery/ Procedure: 11:30am  Hospital/Surgical Facility: [facility]  Fax number for surgical facility: [phone]  Primary Physician: Evette Storm  Type of Anesthesia Anticipated: General    Patient has a Health Care Directive or Living Will:  NO    1. NO - Do you have a history of heart attack, stroke, stent, bypass or surgery on an artery in the head, neck, heart or legs?  2. YES - DO YOU EVER HAVE ANY PAIN OR DISCOMFORT IN YOUR CHEST? Has current concerns for pain over the right breast-1 week with no associated concern for lump, nipple drainage, left-sided symptoms, previous similar symptoms.  LMP- 5 days ago  3. NO - Do you have a history of  Heart Failure?  4. NO - Are you troubled by shortness of breath when: walking on the level, up a slight hill or at night?  5. NO - Do you currently have a cold, bronchitis or other respiratory infection?  6. NO - Do you have a cough, shortness of breath or wheezing?  7. NO - Do you sometimes get pains in the calves of your legs when you walk?  8. NO - Do you or anyone in your family have previous history of blood clots?  9. NO - Do you or does anyone in your family have a serious bleeding problem such as prolonged bleeding following surgeries or cuts?  10. NO - Have you ever had problems with anemia or been told to take iron pills?  11. NO - Have you had any abnormal blood loss such as black, tarry or bloody stools, or abnormal vaginal bleeding?  12. NO - Have you ever  had a blood transfusion?  13. NO - Have you or any of your relatives ever had problems with anesthesia?  14. NO - Do you have sleep apnea, excessive snoring or daytime drowsiness?  15. NO - Do you have any prosthetic heart valves?  16. NO - Do you have prosthetic joints?  17. NO - Is there any chance that you may be pregnant?        HPI:                                                      Brief HPI related to upcoming procedure: Patient is here for preoperative clearance for upcoming procedure for bilateral breast Augmentation procedure for small breasts      See problem list for active medical problems.  Problems all longstanding and stable, except as noted/documented.  See ROS for pertinent symptoms related to these conditions.                                                                                                  .    MEDICAL HISTORY:                                                    Patient Active Problem List    Diagnosis Date Noted     Mastodynia of right breast 10/25/2017     Priority: Medium     Chronic nasal congestion 05/02/2017     Priority: Medium     Unintentional weight loss 09/30/2016     Priority: Medium     Need for Tdap vaccination 09/21/2015     Priority: Medium     She received her Tdap vaccine on 9/21/15.       Alpha thalassemia trait      Priority: Medium     Diagnosis updated by automated process. Provider to review and confirm.       Papanicolaou smear of cervix with low grade squamous intraepithelial lesion (LGSIL) 04/22/2013     Priority: Medium     4/18/13 LSIL/ cannot rule out HSIL. Plan-- colposcopy within 3 months.  5/16/13 Discussed case with Dr. Tse, will plan to do diagnostic pap postpartum, no colposcopy now  EDC:12/3/13.   12/19/13: ASCUS pap- plan: repeat pap in 1 year (due on or about 12/19/14)  12/2/14 reminder call  1/2/15 reminder letter  1/19/15 reminder letter  2/24/15 lost to follow up  5/13/15 pap NIL. Plan: repeat pap in 1 year. Tracking.  04/21/17 Would  consider patient to be lost to follow-up.          Past Medical History:   Diagnosis Date     Alpha thalassaemia trait 2013     LSIL (low grade squamous intraepithelial lesion) on Pap smear 4/18/13    cannot rule out HSIL. *Repeat pap postpartum     Past Surgical History:   Procedure Laterality Date     NO HISTORY OF SURGERY       Current Outpatient Prescriptions   Medication Sig Dispense Refill     Prenatal Vit-Fe Fumarate-FA (PRENATAL MULTIVITAMIN  PLUS IRON) 27-0.8 MG TABS Take 1 tablet by mouth daily 100 tablet 3     Ascorbic Acid (VITAMIN C PO)        levonorgestrel (MIRENA) 20 MCG/24HR IUD 1 each (20 mcg) by Intrauterine route once for 1 dose NDC #62592-170-30  Lot #[id number]  Exp 08/18 1 each 0     OTC products: None, except as noted above    No Known Allergies   Latex Allergy: NO    Social History   Substance Use Topics     Smoking status: Never Smoker     Smokeless tobacco: Never Used     Alcohol use No     History   Drug Use No       REVIEW OF SYSTEMS:                                                    C: NEGATIVE for fever, chills, change in weight  I: NEGATIVE for worrisome rashes, moles or lesions  E: NEGATIVE for vision changes or irritation  E/M: NEGATIVE for ear, mouth and throat problems  R: NEGATIVE for significant cough or SOB  BREAST: Right breast pain for one week  CV: NEGATIVE for chest pain, palpitations or peripheral edema  GI: NEGATIVE for nausea, abdominal pain, heartburn, or change in bowel habits  : NEGATIVE for frequency, dysuria, or hematuria  M: NEGATIVE for significant arthralgias or myalgia  N: NEGATIVE for weakness, dizziness or paresthesias  E: NEGATIVE for temperature intolerance, skin/hair changes  H: NEGATIVE for bleeding problems  P: NEGATIVE for changes in mood or affect    EXAM:                                                    /64  Pulse 68  Temp 97.1  F (36.2  C) (Oral)  Wt 87 lb 14.4 oz (39.9 kg)  SpO2 98%  BMI 16.61 kg/m2    GENERAL APPEARANCE: healthy, alert  and no distress     EYES: EOMI, PERRL     HENT: ear canals and TM's normal and nose and mouth without ulcers or lesions     NECK: no adenopathy, no asymmetry, masses, or scars and thyroid normal to palpation     RESP: lungs clear to auscultation - no rales, rhonchi or wheezes     BREAST: normal without masses, tenderness or nipple discharge and no palpable axillary masses or adenopathy     CV: regular rates and rhythm, normal S1 S2, no S3 or S4 and no murmur, click or rub     ABDOMEN:  soft, nontender, no HSM or masses and bowel sounds normal     MS: extremities normal- no gross deformities noted, no evidence of inflammation in joints, FROM in all extremities.     SKIN: no suspicious lesions or rashes     NEURO: Normal strength and tone, sensory exam grossly normal, mentation intact and speech normal     PSYCH: mentation appears normal. and affect normal/bright     LYMPHATICS: No axillary, cervical, or supraclavicular nodes    DIAGNOSTICS:                                                      Results for orders placed or performed in visit on 10/25/17   CBC with platelets and differential   Result Value Ref Range    WBC 5.2 4.0 - 11.0 10e9/L    RBC Count 5.57 (H) 3.8 - 5.2 10e12/L    Hemoglobin 11.9 11.7 - 15.7 g/dL    Hematocrit 37.7 35.0 - 47.0 %    MCV 68 (L) 78 - 100 fl    MCH 21.4 (L) 26.5 - 33.0 pg    MCHC 31.6 31.5 - 36.5 g/dL    RDW 15.1 (H) 10.0 - 15.0 %    Platelet Count 171 150 - 450 10e9/L    % Neutrophils 34.0 %    % Lymphocytes 51.0 %    % Monocytes 5.0 %    % Eosinophils 8.0 %    % Basophils 2.0 %    Absolute Neutrophil 1.8 1.6 - 8.3 10e9/L    Absolute Lymphocytes 2.6 0.8 - 5.3 10e9/L    Absolute Monocytes 0.3 0.0 - 1.3 10e9/L    Absolute Eosinophils 0.4 0.0 - 0.7 10e9/L    Absolute Basophils 0.1 0.0 - 0.2 10e9/L    Microcytes Present     Platelet Estimate Normal     Diff Method Manual Differential          Recent Labs   Lab Test  05/02/17   1041  09/30/16   1115   HGB  11.8  12.9   PLT  188  153   NA   141  139   POTASSIUM  3.7  4.1   CR  0.54  0.83        IMPRESSION:                                                    Reason for surgery/procedure: Small breasts/breast augmentation mammoplasty  Diagnosis/reason for consult: Preoperative clearance    ASSESSMENT / PLAN:  (Z01.818) Preop general physical exam  (primary encounter diagnosis)  Comment:   Hemoglobin   Date Value Ref Range Status   10/25/2017 11.9 11.7 - 15.7 g/dL Final   ]      Plan: CBC with platelets and differential        Patient is cleared for the procedure    (N64.89) Small breast  Comment:   Plan: CBC with platelets and differential        as above      (N64.4) Mastodynia of right breast  Comment: Normal breast exam  Plan: US Breast Right Complete 4 Quadrants        Due to upcoming breast procedure and current concerns for right breast pain, recommended to have a right breast ultrasound for further evaluation  Recent Results (from the past 744 hour(s))   US Breast Right    Narrative    Right breast ultrasound    Comparisons: None available.    History: Right breast pain. Aunt with ovarian cancer.    FINDINGS:       Targeted right breast ultrasound was performed of the area of pain  reported by the patient, 11:00 position 5 cm from the nipple, and  demonstrates normal breast tissue. Both the technologist and  radiologist scanned the patient with ultrasound.     Mammography deferred given normal ultrasound exam and patient age.   This was reviewed with  patient.      Impression    IMPRESSION: BI-RADS CATEGORY: 1 -  NEGATIVE    RECOMMENDED FOLLOW-UP: Clinical follow-up.    Clinical follow-up right breast pain.  Given lack of imaging findings  in the right breast, management would need to be based on clinical  grounds.    Based on the patient history questionnaire completed prior to the  mammogram, the  NCI risk calculator and the NCCN indications for genetic screening,  the patient may be at an increased risk for breast cancer and/or  have an  indication for genetic screening.  She has been sent a letter  informing her of this and a phone number to schedule an appointment in  the [facility] if she so desires.    Code: GC    The patient was given the results of the examination with the  assistance of an .    IRA CISNEROS MD             The proposed surgical procedure is considered LOW risk.    REVISED CARDIAC RISK INDEX  The patient has the following serious cardiovascular risks for perioperative complications such as (MI, PE, VFib and 3  AV Block):  No serious cardiac risks  INTERPRETATION: 0 risks: Class I (very low risk - 0.4% complication rate)    The patient has the following additional risks for perioperative complications:  No identified additional risks      ICD-10-CM    1. Preop general physical exam Z01.818 CBC with platelets and differential   2. Small breast N64.89 CBC with platelets and differential   3. Mastodynia of right breast N64.4 US Breast Right Complete 4 Quadrants       RECOMMENDATIONS:                                                          --Patient is to take all scheduled medications on the day of surgery EXCEPT for modifications listed below.    APPROVAL GIVEN to proceed with proposed procedure, without further diagnostic evaluation       Signed Electronically by: Evette Storm MD    Copy of this evaluation report is provided to requesting physician.    [place] Preop Guidelines  Chart documentation done in part with Dragon Voice recognition Software. Although reviewed after completion, some word and grammatical error may remain.

## 2017-10-25 NOTE — PATIENT INSTRUCTIONS
Get the labs today  Schedule for right breast ultrasound today or tomorrow (before her breast surgery)      Before Your Surgery      Call your surgeon if there is any change in your health. This includes signs of a cold or flu (such as a sore throat, runny nose, cough, rash or fever).    Do not smoke, drink alcohol or take over the counter medicine (unless your surgeon or primary care doctor tells you to) for the 24 hours before and after surgery.    If you take prescribed drugs: Follow your doctor s orders about which medicines to take and which to stop until after surgery.    Eating and drinking prior to surgery: follow the instructions from your surgeon    Take a shower or bath the night before surgery. Use the soap your surgeon gave you to gently clean your skin. If you do not have soap from your surgeon, use your regular soap. Do not shave or scrub the surgery site.  Wear clean pajamas and have clean sheets on your bed.

## 2017-10-25 NOTE — MR AVS SNAPSHOT
After Visit Summary   10/25/2017    Maria Del Rosario Moreno    MRN: [id number]           Patient Information     Date Of Birth          1990        Visit Information        Provider Department      10/25/2017 10:45 AM Evette Storm MD; [employer] [facility]        Today's Diagnoses     Preop general physical exam    -  1    Small breast        Mastodynia of right breast          Care Instructions    Get the labs today  Schedule for right breast ultrasound today or tomorrow (before her breast surgery)      Before Your Surgery      Call your surgeon if there is any change in your health. This includes signs of a cold or flu (such as a sore throat, runny nose, cough, rash or fever).    Do not smoke, drink alcohol or take over the counter medicine (unless your surgeon or primary care doctor tells you to) for the 24 hours before and after surgery.    If you take prescribed drugs: Follow your doctor s orders about which medicines to take and which to stop until after surgery.    Eating and drinking prior to surgery: follow the instructions from your surgeon    Take a shower or bath the night before surgery. Use the soap your surgeon gave you to gently clean your skin. If you do not have soap from your surgeon, use your regular soap. Do not shave or scrub the surgery site.  Wear clean pajamas and have clean sheets on your bed.           Follow-ups after your visit        Your next 10 appointments already scheduled     Oct 26, 2017  9:40 AM CDT   US BREAST RIGHT CMPL 4 QUAD with [id number], MG Joyride   [facility]    [id number] [place] [id number] [phone]           Please bring a list of your medicines (including vitamins, minerals and over-the-counter drugs). Also, tell your doctor about any allergies you may have. Wear comfortable clothes and leave your valuables at home.  You do not need to do anything  special to prepare for your exam.  Please call the Imaging Department at your exam site with any questions.              Future tests that were ordered for you today     Open Future Orders        Priority Expected Expires Ordered    US Breast Right Complete 4 Quadrants Routine  10/25/2018 10/25/2017            Who to contact     If you have questions or need follow up information about today's clinic visit or your schedule please contact [facility] directly at [phone].  Normal or non-critical lab and imaging results will be communicated to you by [employer], letter or phone within 4 business days after the clinic has received the results. If you do not hear from us within 7 days, please contact the clinic through [employer] or phone. If you have a critical or abnormal lab result, we will notify you by phone as soon as possible.  Submit refill requests through [employer] or call your pharmacy and they will forward the refill request to us. Please allow 3 business days for your refill to be completed.          Additional Information About Your Visit        [employer] Information     [employer] is an electronic gateway that provides easy, online access to your medical records. With [employer], you can request a clinic appointment, read your test results, renew a prescription or communicate with your care team.     To sign up for [employer] visit the website at [email or web address]   You will be asked to enter the access code listed below, as well as some personal information. Please follow the directions to create your username and password.     Your access code is: [email or web address]  Expires: 10/31/2017  4:22 PM     Your access code will  in 90 days. If you need help or a new code, please contact your [facility] or call [phone] for assistance.        Care EveryWhere ID     This is your Care EveryWhere ID. This could be used by other organizations to access your [place]  medical records  [id number]        Your Vitals Were     Pulse Temperature Pulse Oximetry BMI (Body Mass Index)          68 97.1  F (36.2  C) (Oral) 98% 16.61 kg/m2         Blood Pressure from Last 3 Encounters:   10/25/17 104/64   08/02/17 98/60   05/02/17 100/70    Weight from Last 3 Encounters:   10/25/17 87 lb 14.4 oz (39.9 kg)   08/02/17 88 lb 1.6 oz (40 kg)   05/02/17 83 lb 1.6 oz (37.7 kg)              We Performed the Following     CBC with platelets and differential        Primary Care Provider Office Phone # Fax #    Evette Storm [phone] [address] [facility] [address]        Equal Access to Services     [facility]: Hadii aad ku hadasho Soomaali, waaxda luqadaha, qaybta kaalmada veronicayaandrew, aleja dillon . So [facility] [phone].    ATENCIÓN: Si habla español, tiene a meade disposición servicios gratuitos de asistencia lingüística. Llame al [phone].    We comply with applicable federal civil rights laws and [place] laws. We do not discriminate on the basis of race, color, national origin, age, disability, sex, sexual orientation, or gender identity.            Thank you!     Thank you for choosing [facility]  for your care. Our goal is always to provide you with excellent care. Hearing back from our patients is one way we can continue to improve our services. Please take a few minutes to complete the written survey that you may receive in the mail after your visit with us. Thank you!             Your Updated Medication List - Protect others around you: Learn how to safely use, store and throw away your medicines at www.disposemymeds.org.          This list is accurate as of: 10/25/17 11:36 AM.  Always use your most recent med list.                   Brand Name Dispense Instructions for use Diagnosis    levonorgestrel 20 MCG/24HR IUD    MIRENA    1 each    1 each (20 mcg) by Intrauterine route once for 1 dose NDC  #96556-708-22 Lot #[id number] Exp 08/18    Encounter for IUD insertion       prenatal multivitamin plus iron 27-0.8 MG Tabs per tablet     100 tablet    Take 1 tablet by mouth daily    Routine postpartum follow-up, Unintentional weight loss       VITAMIN C PO

## 2017-10-25 NOTE — NURSING NOTE
"Chief Complaint   Patient presents with     Pre-Op Exam     Breast Pain     Patient c/o right breast pain x1 week       Initial /64  Pulse 68  Temp 97.1  F (36.2  C) (Oral)  Wt 87 lb 14.4 oz (39.9 kg)  SpO2 98%  BMI 16.61 kg/m2 Estimated body mass index is 16.61 kg/(m^2) as calculated from the following:    Height as of 8/2/17: 5' 1\" (1.549 m).    Weight as of this encounter: 87 lb 14.4 oz (39.9 kg).  BP completed using cuff size: pediatric  Ellis Boland, CMA    "

## 2017-10-26 ENCOUNTER — RADIANT APPOINTMENT (OUTPATIENT)
Dept: ULTRASOUND IMAGING | Facility: CLINIC | Age: 27
End: 2017-10-26
Attending: FAMILY MEDICINE
Payer: COMMERCIAL

## 2017-10-26 DIAGNOSIS — N64.4 MASTODYNIA OF RIGHT BREAST: ICD-10-CM

## 2017-10-26 PROCEDURE — 76642 ULTRASOUND BREAST LIMITED: CPT | Mod: RT | Performed by: STUDENT IN AN ORGANIZED HEALTH CARE EDUCATION/TRAINING PROGRAM

## 2017-10-30 NOTE — NURSING NOTE
Pre-op faxed.    Taya Cervantes RN,   [facility], [facility] [place] [facility]

## 2017-11-26 ENCOUNTER — HEALTH MAINTENANCE LETTER (OUTPATIENT)
Age: 27
End: 2017-11-26

## 2018-11-27 ENCOUNTER — HEALTH MAINTENANCE LETTER (OUTPATIENT)
Age: 28
End: 2018-11-27

## 2018-12-19 ENCOUNTER — OFFICE VISIT (OUTPATIENT)
Dept: PEDIATRICS | Facility: CLINIC | Age: 28
End: 2018-12-19
Payer: COMMERCIAL

## 2018-12-19 VITALS
TEMPERATURE: 97.7 F | BODY MASS INDEX: 16.21 KG/M2 | WEIGHT: 85.8 LBS | OXYGEN SATURATION: 99 % | SYSTOLIC BLOOD PRESSURE: 94 MMHG | HEART RATE: 67 BPM | DIASTOLIC BLOOD PRESSURE: 60 MMHG

## 2018-12-19 DIAGNOSIS — J01.10 ACUTE NON-RECURRENT FRONTAL SINUSITIS: Primary | ICD-10-CM

## 2018-12-19 DIAGNOSIS — Z23 NEED FOR PROPHYLACTIC VACCINATION AND INOCULATION AGAINST INFLUENZA: ICD-10-CM

## 2018-12-19 DIAGNOSIS — R09.81 NASAL CONGESTION: ICD-10-CM

## 2018-12-19 PROCEDURE — 90686 IIV4 VACC NO PRSV 0.5 ML IM: CPT | Performed by: FAMILY MEDICINE

## 2018-12-19 PROCEDURE — 99213 OFFICE O/P EST LOW 20 MIN: CPT | Mod: 25 | Performed by: FAMILY MEDICINE

## 2018-12-19 PROCEDURE — 90471 IMMUNIZATION ADMIN: CPT | Performed by: FAMILY MEDICINE

## 2018-12-19 RX ORDER — AMOXICILLIN 875 MG
875 TABLET ORAL 2 TIMES DAILY
Qty: 14 TABLET | Refills: 0 | Status: SHIPPED | OUTPATIENT
Start: 2018-12-19 | End: 2018-12-26

## 2018-12-19 RX ORDER — FLUTICASONE PROPIONATE 50 MCG
2 SPRAY, SUSPENSION (ML) NASAL DAILY
Qty: 1 BOTTLE | Refills: 0 | Status: SHIPPED | OUTPATIENT
Start: 2018-12-19 | End: 2020-04-08

## 2018-12-19 ASSESSMENT — PAIN SCALES - GENERAL: PAINLEVEL: NO PAIN (0)

## 2018-12-19 NOTE — PROGRESS NOTES
SUBJECTIVE:   Maria Del Rosario Moreno is a 28 year old female who presents to clinic today for the following health issues:    Acute Illness  Patient with no significant past medical history is here with a weight and was intubated with concerns of having ongoing nasal congestion, postnasal drainage and productive cough of yellow-green sputum for the past several weeks, since the start of the winter associated with fatigue, frontal headache, feeling sinus pressure and minor sore throat and decreased appetite but with no associated concerns for fever, chills, shortness of breath, wheezing  Does not smoke. Has no h/o asthnma or allergies.  Had similar symptoms last winter     Acute illness concerns: cough, cold s  Onset: few weeks since the start of winter, worse since the past 1-2 weeks      Fever: no    Chills/Sweats: no    Headache (location?): YES- frontal    Sinus Pressure:YES    Conjunctivitis:  no    Ear Pain: no    Rhinorrhea: YES    Congestion: no    Sore Throat: no     Cough: YES    Wheeze: no    Decreased Appetite: YES- some    Nausea: no    Vomiting: no    Diarrhea:  no    Dysuria/Freq.: no    Fatigue/Achiness: YES    Sick/Strep Exposure: YES- children at home and co-workers     Therapies Tried and outcome: Tylenol, OTC cough medicine          Problem list and histories reviewed & adjusted, as indicated.  Additional history: as documented    Patient Active Problem List   Diagnosis     Papanicolaou smear of cervix with low grade squamous intraepithelial lesion (LGSIL)     Alpha thalassemia trait     Need for Tdap vaccination     Unintentional weight loss     Chronic nasal congestion     Mastodynia of right breast     Past Surgical History:   Procedure Laterality Date     NO HISTORY OF SURGERY         Social History     Tobacco Use     Smoking status: Never Smoker     Smokeless tobacco: Never Used   Substance Use Topics     Alcohol use: No     Family History   Problem Relation Age of Onset     Diabetes Father          d/c'd age 52     Cardiovascular Mother          Current Outpatient Medications   Medication Sig Dispense Refill     amoxicillin (AMOXIL) 875 MG tablet Take 1 tablet (875 mg) by mouth 2 times daily for 7 days 14 tablet 0     fluticasone (FLONASE) 50 MCG/ACT nasal spray Spray 2 sprays in nostril daily 1 Bottle 0     Ascorbic Acid (VITAMIN C PO)        levonorgestrel (MIRENA) 20 MCG/24HR IUD 1 each (20 mcg) by Intrauterine route once for 1 dose NDC #84539-848-56  Lot #[id number]  Exp 08/18 1 each 0     Prenatal Vit-Fe Fumarate-FA (PRENATAL MULTIVITAMIN  PLUS IRON) 27-0.8 MG TABS Take 1 tablet by mouth daily (Patient not taking: Reported on 12/19/2018) 100 tablet 3     No Known Allergies  Recent Labs   Lab Test 05/02/17  1041 09/30/16  1115   ALT 26 31   CR 0.54 0.83   GFRESTIMATED >90  Non  GFR Calc   84   GFRESTBLACK >90   GFR Calc   >90   GFR Calc     POTASSIUM 3.7 4.1   TSH 0.93 0.92      BP Readings from Last 3 Encounters:   12/19/18 94/60   10/25/17 104/64   08/02/17 98/60    Wt Readings from Last 3 Encounters:   12/19/18 38.9 kg (85 lb 12.8 oz)   10/25/17 39.9 kg (87 lb 14.4 oz)   08/02/17 40 kg (88 lb 1.6 oz)                  Labs reviewed in EPIC    Reviewed and updated as needed this visit by clinical staff       Reviewed and updated as needed this visit by Provider         ROS:  CONSTITUTIONAL: NEGATIVE for fever, chills, change in weight  INTEGUMENTARY/SKIN: NEGATIVE for worrisome rashes, moles or lesions  EYES: NEGATIVE for vision changes or irritation  ENT/MOUTH: as above  RESP:as above  CV: NEGATIVE for chest pain, palpitations or peripheral edema  GI: NEGATIVE for nausea, abdominal pain, heartburn, or change in bowel habits  MUSCULOSKELETAL: NEGATIVE for significant arthralgias or myalgia  PSYCHIATRIC: NEGATIVE for changes in mood or affect    OBJECTIVE:     BP 94/60 (BP Location: Right arm, Patient Position: Sitting, Cuff Size: Adult Regular)   Pulse 67    Temp 97.7  F (36.5  C) (Oral)   Wt 38.9 kg (85 lb 12.8 oz)   SpO2 99%   BMI 16.21 kg/m    Body mass index is 16.21 kg/m .  GENERAL: healthy, alert and no distress  EYES: Eyes grossly normal to inspection  HENT: normal cephalic/atraumatic, both ears: mucopurulent effusion, nasal mucosa edematous , rhinorrhea yellow, oropharynx clear, oral mucous membranes moist, sinuses: frontal tenderness on both sides and postnasal drip  NECK: no adenopathy, no asymmetry, masses, or scars and thyroid normal to palpation  RESP: lungs clear to auscultation - no rales, rhonchi or wheezes  CV: regular rate and rhythm, normal S1 S2, no S3 or S4, no murmur, click or rub, no peripheral edema and peripheral pulses strong  MS: no gross musculoskeletal defects noted, no edema  SKIN: no suspicious lesions or rashes  PSYCH: mentation appears normal, affect normal/bright    Diagnostic Test Results:  none     ASSESSMENT/PLAN:             1. Acute non-recurrent frontal sinusitis  Dosing and potential medication side effects discussed.  Recommended to push fluids, warm face compresses, rest, tylenol prn for pain, wet steam inhalation and RTC in 1week if no better by then or sooner prn.    - amoxicillin (AMOXIL) 875 MG tablet; Take 1 tablet (875 mg) by mouth 2 times daily for 7 days  Dispense: 14 tablet; Refill: 0    2. Nasal congestion  Recommended Flonase nasal sprays for the next 3-4 weeks  Dosing and potential medication side effects discussed.  Patient verbalised understanding and is agreeable to the plan.    - fluticasone (FLONASE) 50 MCG/ACT nasal spray; Spray 2 sprays in nostril daily  Dispense: 1 Bottle; Refill: 0    3. Need for prophylactic vaccination and inoculation against influenza    - FLU VACCINE, SPLIT VIRUS, IM (QUADRIVALENT) [90957]- >3 YRS  - Vaccine Administration, Initial [65305]    Chart documentation done in part with Dragon Voice recognition Software. Although reviewed after completion, some word and grammatical error  may remain.    See Patient Instructions    Evette Storm MD  [facility]

## 2018-12-19 NOTE — PATIENT INSTRUCTIONS
Get the flu shot today    Start on antibiotic for 7 days  Use the flonase sprays for 4 weeks      Patient Education     Viêm Xoang M?i (?i?u Tr? B?ng Thu?c Tr? Sinh)    Các xoang là các phòng ch?a ??y không khí ? bên john x??ng c?a m?t. Chúng n?i v?i ph?n bên john c?a m?i. Viêm xoang m?i là s?ng l?p mô lót john h?c xoang m?i. Viêm xoang m?i có th? x?y ra john th?i emily b? c?m l?nh. Nó c?ng có th? do d? ?ng ??i v?i ph?n ju và các h?t nh? li ti khác john không khí. Viêm xoang m?i có th? gây ra các tri?u ch?ng ngh?t và phù xoang m?i. Danisha?m trùng xoang m?i gây s?t, nh?c ??u và ?au m?t. Th??ng thì ch?t d?ch ti?t ra có màu xanh lá cây ho?c màu vàng t? m?i ho?c ch?y vào phía deepthi c? h?ng (ch?y n??c m?i vào phía deepthi c?). Quý v? ???c cho dùng thu?c tr? sinh ?? ?i?u tr? cho tình tr?ng này.  Ch?m sóc t?i maged    Dannie tr?n quá trình ?i?u tr? b?ng thu?c tr? sinh dannie ch? d?n. Không ng?ng dùng thu?c, m?c dù quý v? c?m th?y ?? h?n.    U?ng th?t danisha?u n??c, trà nóng, và các ch?t l?ng khác. ?i?u này có th? giúp cho ch?t nhày ???c loãng ra. Nó c?ng có th? làm cho ti?t d?ch danisha?u h?n t? xoang m?i.    Danisah?t có th? giúp làm d?u nh?ng vùng b? ?au trên m?t. Dùng m?t kh?n hanley nhúng vào n??c nóng. Ho?c, ??ng john vòi sen và x?t th?ng n??c nóng vào m?t c?a quý v?. Dùng m?t máy b?c h?i n??c có thêm ch?t b?c hà john ?ó vào ban ?êm c?ng có th? h?u ích.     Thu?c long ??m có ch?t guaifenesin có th? giúp làm loãng ch?t nhày và maged t?ng s? ti?t d?ch t? các xoang m?i.    Có th? dùng thu?c ch?ng ngh?t m?i tr t? do ngoài qu?y tr? khi m?t lo?i thu?c t??ng t? ?ã ???c kê toa. Thu?c x?t m?i có tác d?ng nhanh nh?t. Dùng m?t john các thu?c có ch?a phenylephrine ho?c oxymetazoline. Tr??c tiên h? m?i m?t cách nh? nhàng. Deepthi ?ó dùng thu?c x?t. Không dùng thu?c này th??ng h?n là ?ã ???c c?n d?n trên nhãn hi?u n?u không các tri?u ch?ng có th? b? tr?m tr?ng h?n. Quý v? c?ng có th? dùng thu?c viên nén có ch?t pseudoephedrine. Tránh dùng các s?n ph?m k?t  h?p các thành ph?n nguyên li?u, vì các ph?n ?ng ph? có th? maged t?ng. ??c các nhãn hi?u. Quý v? c?ng có th? yêu c?u d??c s? giúp ??. (GHI CHÚ: Nh?ng ng??i b? áp maxi?t [place] không nên dùng thu?c ch?ng ngh?t m?i. Chúng có th? làm maged t?ng áp maxi?t.)    Thu?c ch?ng histamine tr t? do ngoài qu?y có th? h?u ích n?u b? d? ?ng kèm kush v?i ch?ng viêm xoang m?i c?a quý v?.      Không dùng thu?c x?i ho?c r?a thông m?i john khi b? shaheed?m trùng xoang m?i c?p tính, tr? khi ???c c?n d?n b?i nhân viên y t? c?a quý v?. Vi?c x?i r?a có th? làm cho shaheed?m trùng rai sang các xoang m?i khác.    Dùng acetaminophen ho?c ibuprofen ?? ki?m ch? c?n ?au, tr? khi thu?c gi?m ?au khác ???c kê toa. (N?u quý v? b? b?nh charlette ho?c th?n mãn tính ho?c ?ã t?ng b? loét marialuisa t?, hãy bàn v?i bác s? c?a quý v? tr??c khi dùng các thu?c này. Không marialuisa gi? ???c ??a aspirin cho b?t c? ai d??i 20 tu?i b? b?nh có lên c?n s?t. Nó có th? làm charlette b? t?n h?i nghiêm tr?ng.)    Không hút thu?c lá. ?i?u này có th? làm các tri?u ch?ng b? tr?m tr?ng thêm.  Ch?m sóc kush dõi  Khám kush dõi v?i nhân viên y t? c?a quý v? ho?c nhân viên c?a chúng tôi n?u quý v? không ?? h?n john tu?n t?i.  Khi nào ?i tìm s? khuyên nh? v? y t?  G?i nhân viên y t? n?u quý v? b? b?t c? nh?ng ?i?u nào esther ?ây x?y ra:    ?au m?t ho?c nh?c ??u b? n?ng h?n    C? c?ng    Bu?n ng?, ho?c l?n l?n khác th??ng    S?ng ? trán ho?c mí m?t    Các tr? ng?i v? th? giác, marialuisa g?m vi?c nhìn th?y m? ho?c hình ?ôi    S?t t? 100.4 F (38 C) tr? lên, ho?c kush ch? d?n c?a nhân viên y t?    Kinh gi?t    Các tr?c tr?c v? hít th?    Các tri?u ch?ng không gi?i mark?t ???c john jackman 10 ngày  Date Last Reviewed: 4/13/2015 2000-2018 [employer]. [address] [place] [address], [place] [address]. T?t c? các mark?n ???c b?o l?u. Thông tin này không nh?m thay th? cho d?ch v? ch?m sóc y t? vale tính chuyên môn. C?n luôn tuân kush s? ch? d?n t? chuyên maged ch?m sóc s?c whit? c?a quý v?.

## 2018-12-19 NOTE — PROGRESS NOTES

## 2019-10-11 ENCOUNTER — OFFICE VISIT (OUTPATIENT)
Dept: PEDIATRICS | Facility: CLINIC | Age: 29
End: 2019-10-11
Payer: COMMERCIAL

## 2019-10-11 VITALS
HEART RATE: 68 BPM | WEIGHT: 89.8 LBS | SYSTOLIC BLOOD PRESSURE: 104 MMHG | OXYGEN SATURATION: 98 % | DIASTOLIC BLOOD PRESSURE: 70 MMHG | HEIGHT: 61 IN | BODY MASS INDEX: 16.95 KG/M2 | TEMPERATURE: 98 F

## 2019-10-11 DIAGNOSIS — J31.0 CHRONIC RHINITIS: ICD-10-CM

## 2019-10-11 DIAGNOSIS — R63.4 UNINTENTIONAL WEIGHT LOSS: ICD-10-CM

## 2019-10-11 DIAGNOSIS — Z23 NEED FOR PROPHYLACTIC VACCINATION AND INOCULATION AGAINST INFLUENZA: ICD-10-CM

## 2019-10-11 DIAGNOSIS — J01.00 ACUTE NON-RECURRENT MAXILLARY SINUSITIS: Primary | ICD-10-CM

## 2019-10-11 PROCEDURE — 99214 OFFICE O/P EST MOD 30 MIN: CPT | Mod: 25 | Performed by: FAMILY MEDICINE

## 2019-10-11 PROCEDURE — 90471 IMMUNIZATION ADMIN: CPT | Performed by: FAMILY MEDICINE

## 2019-10-11 PROCEDURE — 90686 IIV4 VACC NO PRSV 0.5 ML IM: CPT | Performed by: FAMILY MEDICINE

## 2019-10-11 RX ORDER — FLUTICASONE PROPIONATE 50 MCG
2 SPRAY, SUSPENSION (ML) NASAL DAILY
Qty: 16 G | Refills: 3 | Status: SHIPPED | OUTPATIENT
Start: 2019-10-11 | End: 2021-04-21

## 2019-10-11 RX ORDER — CETIRIZINE HYDROCHLORIDE 10 MG/1
10 TABLET ORAL DAILY
Qty: 30 TABLET | Refills: 3 | Status: SHIPPED | OUTPATIENT
Start: 2019-10-11 | End: 2021-04-21

## 2019-10-11 RX ORDER — AZITHROMYCIN 250 MG/1
TABLET, FILM COATED ORAL
Qty: 6 TABLET | Refills: 0 | Status: SHIPPED | OUTPATIENT
Start: 2019-10-11 | End: 2020-04-08

## 2019-10-11 ASSESSMENT — MIFFLIN-ST. JEOR: SCORE: 1075.74

## 2019-10-11 ASSESSMENT — PAIN SCALES - GENERAL: PAINLEVEL: MILD PAIN (2)

## 2019-10-11 NOTE — PROGRESS NOTES
Subjective     Maria Del Rosario Moreno is a 29 year old female who presents to clinic today for the following health issues:    HPI   Acute Illness    Patient is here with a Japanese  with concerns of having pain in both the cheeks, pain over the teeth, pressure sensation in the forehead, purulent nasal drainage, mild, minimally productive cough of yellow-green sputum, low-grade fever and chills for the past 1 week with worsening symptoms in the past 2 days.  Has decreased appetite and fatigue for the past 3 days.  Patient has used antibiotic[place] that her mom that her mom brought for the past 2 days  She does not have pets, denies history of smoking, asthma.  Denies wheezing, shortness of breath  Has recent sick contact exposure at work.  Patient has been using loratadine for possible allergies that did not help with symptom relief  She is also worried about her ongoing unintentional weight loss for the past 2 years.  Patient was seen 2 years ago for the same, she had extensive work-up that was negative she continues to have good appetite other than for now because of her acute sickness  In getting more history, her weight loss coincided with the IUD-Mirena insertion in   Patient is wondering when this is due for removal  She is  2 para 2, last childbirth-4 years ago, patient is sexually active with her   She did not notice any interim changes in between in the past 2 years since we last saw her     Acute illness concerns:   Onset: over 1 week    Fever: YES    Chills/Sweats: YES    Headache (location?): YES    Sinus Pressure:YES    Conjunctivitis:  no    Ear Pain: no    Rhinorrhea: YES    Congestion: no     Sore Throat: no      Cough: YES-mild with productive of green sputum    Wheeze: no     Decreased Appetite: YES    Nausea: YES    Vomiting: no    Diarrhea:  no    Dysuria/Freq.: no    Fatigue/Achiness: YES    Sick/Strep Exposure: YES- at work     Therapies Tried and outcome: Antibiotic  from Mom which helped        Patient Active Problem List   Diagnosis     Papanicolaou smear of cervix with low grade squamous intraepithelial lesion (LGSIL)     Alpha thalassemia trait     Need for Tdap vaccination     Unintentional weight loss     Chronic nasal congestion     Mastodynia of right breast     Past Surgical History:   Procedure Laterality Date     NO HISTORY OF SURGERY         Social History     Tobacco Use     Smoking status: Never Smoker     Smokeless tobacco: Never Used   Substance Use Topics     Alcohol use: No     Family History   Problem Relation Age of Onset     Diabetes Father         d/c'd age 52     Cardiovascular Mother          Current Outpatient Medications   Medication Sig Dispense Refill     Ascorbic Acid (VITAMIN C PO)        azithromycin (ZITHROMAX) 250 MG tablet Two tablets first day, then one tablet daily for four days. 6 tablet 0     cetirizine (ZYRTEC) 10 MG tablet Take 1 tablet (10 mg) by mouth daily 30 tablet 3     fluticasone (FLONASE) 50 MCG/ACT nasal spray Spray 2 sprays into both nostrils daily 16 g 3     fluticasone (FLONASE) 50 MCG/ACT nasal spray Spray 2 sprays in nostril daily 1 Bottle 0     levonorgestrel (MIRENA) 20 MCG/24HR IUD 1 each (20 mcg) by Intrauterine route once for 1 dose NDC #82905-129-49  Lot #[id number]  Exp 08/18 1 each 0     Prenatal Vit-Fe Fumarate-FA (PRENATAL MULTIVITAMIN  PLUS IRON) 27-0.8 MG TABS Take 1 tablet by mouth daily (Patient not taking: Reported on 12/19/2018) 100 tablet 3     No Known Allergies  Recent Labs   Lab Test 05/02/17  1041 09/30/16  1115   ALT 26 31   CR 0.54 0.83   GFRESTIMATED >90  Non  GFR Calc   84   GFRESTBLACK >90   GFR Calc   >90   GFR Calc     POTASSIUM 3.7 4.1   TSH 0.93 0.92      BP Readings from Last 3 Encounters:   10/11/19 104/70   12/19/18 94/60   10/25/17 104/64    Wt Readings from Last 3 Encounters:   10/11/19 40.7 kg (89 lb 12.8 oz)   12/19/18 38.9 kg (85 lb 12.8 oz)  "  10/25/17 39.9 kg (87 lb 14.4 oz)                      Reviewed and updated as needed this visit by Provider         Review of Systems   ROS COMP: CONSTITUTIONAL:as above  INTEGUMENTARY/SKIN: NEGATIVE for worrisome rashes, moles or lesions  EYES: NEGATIVE for vision changes or irritation  ENT/MOUTH: as above  RESP:as above  CV: NEGATIVE for chest pain, palpitations or peripheral edema  GI: NEGATIVE for nausea, abdominal pain, heartburn, or change in bowel habits  : Mirena IUD  MUSCULOSKELETAL: NEGATIVE for significant arthralgias or myalgia  NEURO: NEGATIVE for weakness, dizziness or paresthesias  ENDOCRINE: NEGATIVE for temperature intolerance, skin/hair changes  HEME/ALLERGY/IMMUNE: NEGATIVE for bleeding problems  PSYCHIATRIC: NEGATIVE for changes in mood or affect      Objective    /70 (BP Location: Right arm, Patient Position: Sitting, Cuff Size: Adult Regular)   Pulse 68   Temp 98  F (36.7  C) (Oral)   Ht 1.543 m (5' 0.75\")   Wt 40.7 kg (89 lb 12.8 oz)   SpO2 98%   BMI 17.11 kg/m    Body mass index is 17.11 kg/m .  Physical Exam   GENERAL: healthy, alert and no distress, thin built  EYES: Eyes grossly normal to inspection  HENT: normal cephalic/atraumatic, both ears: mucopurulent effusion, nasal mucosa edematous , oropharynx clear, oral mucous membranes moist and sinuses: maxillary tenderness on both sides  NECK: no adenopathy, no asymmetry, masses, or scars and thyroid normal to palpation  RESP: lungs clear to auscultation - no rales, rhonchi or wheezes  CV: regular rate and rhythm, normal S1 S2, no S3 or S4, no murmur, click or rub, no peripheral edema and peripheral pulses strong  ABDOMEN: soft, nontender, no hepatosplenomegaly, no masses and bowel sounds normal  MS: no gross musculoskeletal defects noted, no edema  SKIN: no suspicious lesions or rashes  PSYCH: mentation appears normal, affect normal/bright    Diagnostic Test Results:  Labs reviewed in Epic        Assessment & Plan     1. " Acute non-recurrent maxillary sinusitis  Dosing and potential medication side effects discussed.  Recommended to push fluids, warm face compresses, rest, tylenol prn for pain, wet steam inhalation and RTC in 1week if no better by then or sooner prn.    - azithromycin (ZITHROMAX) 250 MG tablet; Two tablets first day, then one tablet daily for four days.  Dispense: 6 tablet; Refill: 0    2. Chronic rhinitis  Recommended patient to stop taking Claritin, start on Zyrtec 10 mg once daily at bedtime, start on Flonase nasal sprays daily for at least next 3 months  Dosing and potential medication side effects discussed.  Patient verbalised understanding and is agreeable to the plan.    - fluticasone (FLONASE) 50 MCG/ACT nasal spray; Spray 2 sprays into both nostrils daily  Dispense: 16 g; Refill: 3  - cetirizine (ZYRTEC) 10 MG tablet; Take 1 tablet (10 mg) by mouth daily  Dispense: 30 tablet; Refill: 3    3. Unintentional weight loss  Wt Readings from Last 5 Encounters:   10/11/19 40.7 kg (89 lb 12.8 oz)   12/19/18 38.9 kg (85 lb 12.8 oz)   10/25/17 39.9 kg (87 lb 14.4 oz)   08/02/17 40 kg (88 lb 1.6 oz)   05/02/17 37.7 kg (83 lb 1.6 oz)     Given patient's description of coincidence of weight loss occurring with IUD insertion, she would think about having the IUD removed and consider alternate options for contraception  Patient is willing to have it removed , she will call to schedule for physical, pelvic exam and Pap and IUD removal in the next 3 to 4 weeks    4. Need for prophylactic vaccination and inoculation against influenza    - INFLUENZA VACCINE IM > 6 MONTHS VALENT IIV4 [45724]  - Vaccine Administration, Initial [04673]       Chart documentation done in part with Dragon Voice recognition Software. Although reviewed after completion, some word and grammatical error may remain.    See Patient Instructions    Return in about 4 weeks (around 11/8/2019) for Annual Exam, Fasting Labs.    Evette Storm MD   HEALTH  [facility]

## 2019-10-11 NOTE — PATIENT INSTRUCTIONS
Get the flu shot today  Schedule for physical, fasting labs in 3-4 weeks  Start on z-bella  Stop claritin and start on zyrtec 10mg daily along with flonase sprays daily

## 2019-10-27 DIAGNOSIS — Z13.1 SCREENING FOR DIABETES MELLITUS (DM): ICD-10-CM

## 2019-10-27 DIAGNOSIS — Z13.0 SCREENING FOR DEFICIENCY ANEMIA: ICD-10-CM

## 2019-10-27 DIAGNOSIS — Z13.6 CARDIOVASCULAR SCREENING; LDL GOAL LESS THAN 160: ICD-10-CM

## 2019-10-27 DIAGNOSIS — Z00.00 ROUTINE GENERAL MEDICAL EXAMINATION AT A HEALTH CARE FACILITY: Primary | ICD-10-CM

## 2019-10-27 DIAGNOSIS — Z13.29 SCREENING FOR THYROID DISORDER: ICD-10-CM

## 2020-04-06 ENCOUNTER — TELEPHONE (OUTPATIENT)
Dept: PEDIATRICS | Facility: CLINIC | Age: 30
End: 2020-04-06

## 2020-04-06 NOTE — TELEPHONE ENCOUNTER
With [place] , called the number, left message with phone number to call back.    Dang Jeronimo RN, [facility]

## 2020-04-06 NOTE — TELEPHONE ENCOUNTER
[facility] Call Center    Phone Message    May a detailed message be left on voicemail: yes     Reason for Call: Other: patient would like to be seen in clinic asap by Dr. Storm due to birth control ring neing uncomfortable and would like it taken out. please advise      Action Taken: Message routed to:  Primary Care p [phone]

## 2020-04-07 NOTE — TELEPHONE ENCOUNTER
Patient was scheduled tomorrow with Dr. Storm for in clinic visit.  Ok per Dr. Storm.    Dang Jeronimo RN, [facility]

## 2020-04-08 ENCOUNTER — OFFICE VISIT (OUTPATIENT)
Dept: PEDIATRICS | Facility: CLINIC | Age: 30
End: 2020-04-08
Payer: COMMERCIAL

## 2020-04-08 ENCOUNTER — APPOINTMENT (OUTPATIENT)
Dept: INTERPRETER SERVICES | Facility: CLINIC | Age: 30
End: 2020-04-08
Payer: COMMERCIAL

## 2020-04-08 VITALS
WEIGHT: 90 LBS | OXYGEN SATURATION: 97 % | DIASTOLIC BLOOD PRESSURE: 75 MMHG | SYSTOLIC BLOOD PRESSURE: 111 MMHG | TEMPERATURE: 98.2 F | BODY MASS INDEX: 17.15 KG/M2 | HEART RATE: 94 BPM

## 2020-04-08 DIAGNOSIS — R30.0 DYSURIA: ICD-10-CM

## 2020-04-08 DIAGNOSIS — N89.8 VAGINAL DISCHARGE: Primary | ICD-10-CM

## 2020-04-08 DIAGNOSIS — B37.31 CANDIDIASIS OF VAGINA: ICD-10-CM

## 2020-04-08 LAB
ALBUMIN UR-MCNC: NEGATIVE MG/DL
APPEARANCE UR: CLEAR
BILIRUB UR QL STRIP: NEGATIVE
COLOR UR AUTO: ABNORMAL
GLUCOSE UR STRIP-MCNC: NEGATIVE MG/DL
HGB UR QL STRIP: ABNORMAL
KETONES UR STRIP-MCNC: NEGATIVE MG/DL
LEUKOCYTE ESTERASE UR QL STRIP: NEGATIVE
NITRATE UR QL: NEGATIVE
NON-SQ EPI CELLS #/AREA URNS LPF: NORMAL /LPF
PH UR STRIP: 6 PH (ref 5–7)
RBC #/AREA URNS AUTO: NORMAL /HPF
SOURCE: ABNORMAL
SP GR UR STRIP: 1.01 (ref 1–1.03)
SPECIMEN SOURCE: NORMAL
UROBILINOGEN UR STRIP-MCNC: NORMAL MG/DL (ref 0–2)
WBC #/AREA URNS AUTO: NORMAL /HPF
WET PREP SPEC: NORMAL

## 2020-04-08 PROCEDURE — 87591 N.GONORRHOEAE DNA AMP PROB: CPT | Performed by: FAMILY MEDICINE

## 2020-04-08 PROCEDURE — 81001 URINALYSIS AUTO W/SCOPE: CPT | Performed by: FAMILY MEDICINE

## 2020-04-08 PROCEDURE — 99213 OFFICE O/P EST LOW 20 MIN: CPT | Performed by: FAMILY MEDICINE

## 2020-04-08 PROCEDURE — 87491 CHLMYD TRACH DNA AMP PROBE: CPT | Performed by: FAMILY MEDICINE

## 2020-04-08 PROCEDURE — 87210 SMEAR WET MOUNT SALINE/INK: CPT | Performed by: FAMILY MEDICINE

## 2020-04-08 RX ORDER — FLUCONAZOLE 150 MG/1
TABLET ORAL
Qty: 2 TABLET | Refills: 0 | Status: SHIPPED | OUTPATIENT
Start: 2020-04-08 | End: 2021-04-21

## 2020-04-08 NOTE — PROGRESS NOTES
Subjective     Maria Del Rosario Moreno is a 30 year old female who presents to clinic today for the following health issues:    HPI             Vaginal Symptoms  Onset: Complaining of progressively worsening itching, swelling of the vulva from scratching, vaginal pain and yellow discharge for the past 1 week  Patient just had her periods that ended 2 days ago  She is sexually active with her , using Mirena IUD for contraception, had a placed in 2015 December  Patient also is complaining of burning urination without urinary urgency, frequency, hematuria  Denies previous history of vaginitis, UTI in the past  Patient denies abdominal or pelvic pain, fever, chills, nausea, vomiting, low back or flank pain    Description:  Vaginal Discharge: Yellow  Itching (Pruritis): YES  Burning sensation:  YES  Odor: YES    Accompanying Signs & Symptoms:  Pain with Urination: YES  Abdominal Pain: no   Fever: no     History:   Sexually active: YES  New Partner: no   Possibility of Pregnancy:  No    Precipitating factors:   Recent Antibiotic Use: no     Alleviating factors:  None    Therapies Tried and outcome: None tried    Patient Active Problem List   Diagnosis     Papanicolaou smear of cervix with low grade squamous intraepithelial lesion (LGSIL)     Alpha thalassemia trait     Need for Tdap vaccination     Unintentional weight loss     Chronic nasal congestion     Mastodynia of right breast     Past Surgical History:   Procedure Laterality Date     NO HISTORY OF SURGERY         Social History     Tobacco Use     Smoking status: Never Smoker     Smokeless tobacco: Never Used   Substance Use Topics     Alcohol use: No     Family History   Problem Relation Age of Onset     Diabetes Father         d/c'd age 52     Cardiovascular Mother          Current Outpatient Medications   Medication Sig Dispense Refill     Ascorbic Acid (VITAMIN C PO)        cetirizine (ZYRTEC) 10 MG tablet Take 1 tablet (10 mg) by mouth daily 30 tablet 3      fluticasone (FLONASE) 50 MCG/ACT nasal spray Spray 2 sprays into both nostrils daily 16 g 3     levonorgestrel (MIRENA) 20 MCG/24HR IUD 1 each (20 mcg) by Intrauterine route once for 1 dose NDC #01819-122-35  Lot #[id number]  Exp 08/18 1 each 0     Prenatal Vit-Fe Fumarate-FA (PRENATAL MULTIVITAMIN  PLUS IRON) 27-0.8 MG TABS Take 1 tablet by mouth daily (Patient not taking: Reported on 12/19/2018) 100 tablet 3     No Known Allergies  Recent Labs   Lab Test 05/02/17  1041 09/30/16  1115   ALT 26 31   CR 0.54 0.83   GFRESTIMATED >90  Non  GFR Calc   84   GFRESTBLACK >90   GFR Calc   >90   GFR Calc     POTASSIUM 3.7 4.1   TSH 0.93 0.92      BP Readings from Last 3 Encounters:   04/08/20 111/75   10/11/19 104/70   12/19/18 94/60    Wt Readings from Last 3 Encounters:   04/08/20 40.8 kg (90 lb)   10/11/19 40.7 kg (89 lb 12.8 oz)   12/19/18 38.9 kg (85 lb 12.8 oz)                      Reviewed and updated as needed this visit by Provider         Review of Systems   ROS COMP: CONSTITUTIONAL: NEGATIVE for fever, chills, change in weight  GI: NEGATIVE for nausea, abdominal pain, heartburn, or change in bowel habits  : as above      Objective    /75   Pulse 94   Temp 98.2  F (36.8  C) (Temporal)   Wt 40.8 kg (90 lb)   SpO2 97%   BMI 17.15 kg/m    Body mass index is 17.15 kg/m .  Physical Exam   GENERAL: healthy, alert and no distress  ABDOMEN: soft, nontender, no hepatosplenomegaly, no masses and bowel sounds normal   (female): Swollen external genitalia with excoriation marks from scratching  Scant yellow vaginal discharge with no odor    Diagnostic Test Results:  Labs reviewed in Epic        Assessment & Plan     1. Vaginal discharge  ddx- vaginitis/? cervicitis  - Wet prep-negative  Will f/u on other results  - NEISSERIA GONORRHOEA PCR  - CHLAMYDIA TRACHOMATIS PCR  - Urine Microscopic  - fluconazole (DIFLUCAN) 150 MG tablet; Take 1 tablet today and repeat second  dose in 3 days  Dispense: 2 tablet; Refill: 0    2. Dysuria  ? UTI  UA- negative for UTI    - *UA reflex to Microscopic and Culture ([place]; [facility]; [facility]; [facility]; [facility]; [facility]; [place]; [place])  - NEISSERIA GONORRHOEA PCR  - CHLAMYDIA TRACHOMATIS PCR    3. Candidiasis of vagina  Wet prep- negative.  Though test is negative, given the clinical signs/symptoms, will treat with diflucan.  Reviewed local hygeine  - fluconazole (DIFLUCAN) 150 MG tablet; Take 1 tablet today and repeat second dose in 3 days  Dispense: 2 tablet; Refill: 0       Chart documentation done in part with Dragon Voice recognition Software. Although reviewed after completion, some word and grammatical error may remain.    See Patient Instructions    No follow-ups on file.    Evette Storm MD  [facility]

## 2020-04-08 NOTE — RESULT ENCOUNTER NOTE
Please call patient with test results  Urine-no concern for infection  Prescription sent for  Diflucan -will take 1 today and repeat in 3 days.  If symptoms are not any better in 1 week, patient will call for further recommendations.

## 2020-04-09 LAB
C TRACH DNA SPEC QL NAA+PROBE: NEGATIVE
N GONORRHOEA DNA SPEC QL NAA+PROBE: NEGATIVE
SPECIMEN SOURCE: NORMAL
SPECIMEN SOURCE: NORMAL

## 2020-12-08 ENCOUNTER — OFFICE VISIT (OUTPATIENT)
Dept: OBGYN | Facility: CLINIC | Age: 30
End: 2020-12-08
Payer: COMMERCIAL

## 2020-12-08 VITALS — DIASTOLIC BLOOD PRESSURE: 63 MMHG | SYSTOLIC BLOOD PRESSURE: 101 MMHG

## 2020-12-08 DIAGNOSIS — Z30.432 ENCOUNTER FOR IUD REMOVAL: Primary | ICD-10-CM

## 2020-12-08 DIAGNOSIS — Z30.430 ENCOUNTER FOR INSERTION OF INTRAUTERINE CONTRACEPTIVE DEVICE: ICD-10-CM

## 2020-12-08 DIAGNOSIS — Z30.432 ENCOUNTER FOR REMOVAL OF INTRAUTERINE CONTRACEPTIVE DEVICE: ICD-10-CM

## 2020-12-08 DIAGNOSIS — Z30.430 ENCOUNTER FOR IUD INSERTION: ICD-10-CM

## 2020-12-08 PROBLEM — Z97.5 IUD (INTRAUTERINE DEVICE) IN PLACE: Status: ACTIVE | Noted: 2020-12-08

## 2020-12-08 PROCEDURE — 58300 INSERT INTRAUTERINE DEVICE: CPT | Performed by: OBSTETRICS & GYNECOLOGY

## 2020-12-08 PROCEDURE — 58301 REMOVE INTRAUTERINE DEVICE: CPT | Performed by: OBSTETRICS & GYNECOLOGY

## 2020-12-08 RX ORDER — COPPER 313.4 MG/1
1 INTRAUTERINE DEVICE INTRAUTERINE ONCE
Status: ACTIVE
Start: 2020-12-08

## 2020-12-08 RX ORDER — COPPER 313.4 MG/1
1 INTRAUTERINE DEVICE INTRAUTERINE ONCE
COMMUNITY

## 2020-12-08 NOTE — PROGRESS NOTES
CC/HPI: Maria Del Rosario Moreno is a 30 year old who presents to the clinic for an IUD removal and insertion.   No LMP recorded. (Menstrual status: IUD)..  Today's pregnancy test was not done due to having the IUD in.  Patient has been provided with written information.  I have reviewed the risks of the IUD including pregnancy, PID, life threatening infection, perforation, expulsion, cramping, changes in bleeding and ovarian cysts. Benefits of the IUD and alternative family planning methods have been discussed.  Patients questions have been answered.  Patient has verbalized understanding of risks and benefits and has signed the consent form.    No Known Allergies  Current Outpatient Medications   Medication Sig Dispense Refill     Ascorbic Acid (VITAMIN C PO)        cetirizine (ZYRTEC) 10 MG tablet Take 1 tablet (10 mg) by mouth daily 30 tablet 3     fluticasone (FLONASE) 50 MCG/ACT nasal spray Spray 2 sprays into both nostrils daily 16 g 3     fluconazole (DIFLUCAN) 150 MG tablet Take 1 tablet today and repeat second dose in 3 days (Patient not taking: Reported on 12/8/2020) 2 tablet 0     levonorgestrel (MIRENA) 20 MCG/24HR IUD 1 each (20 mcg) by Intrauterine route once for 1 dose NDC #42687-729-25  Lot #[id number]  Exp 08/18 1 each 0     Prenatal Vit-Fe Fumarate-FA (PRENATAL MULTIVITAMIN  PLUS IRON) 27-0.8 MG TABS Take 1 tablet by mouth daily (Patient not taking: Reported on 12/19/2018) 100 tablet 3      Past Medical History:   Diagnosis Date     Alpha thalassaemia trait 2013     LSIL (low grade squamous intraepithelial lesion) on Pap smear 4/18/13    cannot rule out HSIL. *Repeat pap postpartum     Family History   Problem Relation Age of Onset     Diabetes Father         d/c'd age 52     Cardiovascular Mother      Social History     Socioeconomic History     Marital status:      Spouse name: Charan     Number of children: 1     Years of education: Not on file     Highest education level: Not on file   Occupational  History     Occupation: nail tech     Employer: UNEMPLOYED   Social Needs     Financial resource strain: Not on file     Food insecurity     Worry: Not on file     Inability: Not on file     Transportation needs     Medical: Not on file     Non-medical: Not on file   Tobacco Use     Smoking status: Never Smoker     Smokeless tobacco: Never Used   Substance and Sexual Activity     Alcohol use: No     Drug use: No     Sexual activity: Yes     Partners: Male     Birth control/protection: I.U.D.   Lifestyle     Physical activity     Days per week: Not on file     Minutes per session: Not on file     Stress: Not on file   Relationships     Social connections     Talks on phone: Not on file     Gets together: Not on file     Attends Bahai service: Not on file     Active member of club or organization: Not on file     Attends meetings of clubs or organizations: Not on file     Relationship status: Not on file     Intimate partner violence     Fear of current or ex partner: Not on file     Emotionally abused: Not on file     Physically abused: Not on file     Forced sexual activity: Not on file   Other Topics Concern     Parent/sibling w/ CABG, MI or angioplasty before 65F 55M? Not Asked   Social History Narrative    From [place] to [place] in 2013.     Past Surgical History:   Procedure Laterality Date     NO HISTORY OF SURGERY           EXAM:  /63 (BP Location: Right arm, Cuff Size: Adult Regular)   PELVIC EXAM:  Vulva: No external lesions, normal hair distribution, no adenopathy  Vagina: Moist, pink, no abnormal discharge, well rugated, no lesions  Cervix: smooth, pink, no visible lesions, neg CMT, IUD stings coming from os   Uterus: Normal size, midposition, non-tender, mobile  Ovaries: No mass, non-tender, mobile  Rectal exam: deferred    IUD type: Paragard T-380  Lot # [id number]  NDC# [address]-2 Exp 1/2026        Procedure:  Uterus assessed for position and is Midposition.  Speculum inserted.  IUD strings grabbed  with a ring forceps and removed intact.  Betadine prep of cervix done.  Uterus sounded to 7cm's.  No cervical dilators were used.   IUD inserted in the usual fashion without significant resistance, severe protracted pain or excessive bleeding. Strings trimmed to 3 cm's.  Patient  tolerated the procedure well without any prolonged pain or syncopy.        Instructions given to patient regarding checking IUD strings, returning to the clinic if pain or inability to check strings and/or irregular bleeding.  Return to the clinic in 4 weeks for IUD follow up   See AVS for complete instructions    Ira Estes DO

## 2020-12-08 NOTE — PATIENT INSTRUCTIONS
INTRAUTERINE DEVICE (IUD) -- IUDs are placed by a doctor or nurse through the vagina and cervix, into the uterus. Most are made of molded plastic and have a string that you can feel in the vagina, but does not extend outside the body. IUDs currently available in the [place] do not increase a woman's risk of ectopic pregnancy, infertility, or long-term risk of infection.  Two IUDs are currently available:  Copper-containing IUD (Paragard ) prevents pregnancy by preventing sperm from reaching the fallopian tubes. The copper-containing IUD lasts for at least 10 years and is highly effective in preventing pregnancy; the pregnancy rate in women who use a copper-containing IUD is less than one percent in the first year of use. Some women who use a copper-containing IUD have heavier and longer menstrual periods.   Levonorgestrel-releasing IUD (Mirena ) prevents pregnancy by thickening the cervical mucus and thinning the endometrium (the lining of the uterus). It also decreases menstrual bleeding by 40 to 90 percent and decreases pain associated with periods. It lasts for at least five years, and is highly effective in preventing pregnancy; the pregnancy rate in women who use a levonorgestrel-releasing IUD is less than one percent in the first year of use. Some women completely stop having menstrual periods while using a levonorgestrel-releasing IUD; this is not harmful and does not require treatment. Menstrual periods will return when the IUD is removed.  Benefits -- An IUD is an ideal method if you do not plan to become pregnant for at least one year (or longer) or you want a method that is highly effective and does not require daily or weekly attention. IUDs are also appropriate for women who do not want to or cannot use estrogen.  IUDs have relatively few side effects, and are reversible. If you decide you want to become pregnant, you can do so by having the IUD removed by a health care professional. IUDs do not  affect your chance of becoming pregnant after the IUD is removed so you can try to get pregnant right away. .  Risks -- There is a small risk that the IUD will come out during your period. You should check your IUD once per month, after your menstrual period, by finding the strings inside the vagina. If you cannot feel the strings, use a backup method (eg, condoms) until you can see a doctor or nurse to be sure the IUD is still there. There is a very low risk of developing an infection after placement of the IUD, and of improper placement.  Precautions -- You should not use an IUD if you recently had a pelvic infection such as gonorrhea or chlamydia. If you have more than one sex partner, talk to your doctor or nurse about the risks and benefits of the IUD.  If you become pregnant while using an IUD, you need an ultrasound to be sure that the pregnancy is inside the uterus, rather than in the fallopian tube (called an ectopic pregnancy). The IUD should be removed when the pregnancy is discovered.

## 2020-12-20 ENCOUNTER — HEALTH MAINTENANCE LETTER (OUTPATIENT)
Age: 30
End: 2020-12-20

## 2021-01-05 ENCOUNTER — OFFICE VISIT (OUTPATIENT)
Dept: OBGYN | Facility: CLINIC | Age: 31
End: 2021-01-05
Payer: COMMERCIAL

## 2021-01-05 VITALS
DIASTOLIC BLOOD PRESSURE: 53 MMHG | HEART RATE: 70 BPM | SYSTOLIC BLOOD PRESSURE: 90 MMHG | WEIGHT: 87.3 LBS | OXYGEN SATURATION: 100 % | BODY MASS INDEX: 16.63 KG/M2

## 2021-01-05 DIAGNOSIS — Z30.431 IUD CHECK UP: Primary | ICD-10-CM

## 2021-01-05 PROCEDURE — 99212 OFFICE O/P EST SF 10 MIN: CPT | Performed by: OBSTETRICS & GYNECOLOGY

## 2021-01-05 NOTE — PROGRESS NOTES
SUBJECTIVE: Maria Del Rosario Moreno is a 30 year old here for IUD check.  Patient had the Paragard IUD placed on 12/8/2020.  She denies problems with the IUD.  She has not attempted to check for the strings.      OBJECTIVE: BP 90/53 (BP Location: Right arm, Cuff Size: Adult Regular)   Pulse 70   Wt 39.6 kg (87 lb 4.8 oz)   SpO2 100%   Breastfeeding No   BMI 16.63 kg/m      PELVIC EXAM:  Vulva: No external lesions, normal hair distribution, no adenopathy  Vagina: Moist, pink, no abnormal discharge, well rugated, no lesions  Cervix: smooth, pink, no visible lesions, neg CMT, IUD strings visualized and are extruding approximately 3 cm from cervical os      ASSESSMENT:  1.)  IUD normally placed.     PLAN:  1.)  Follow up prn    RTC when due for annual exam or suspected problems with her IUD such as abnormal bleeding, disappearance of the strings or feeling the IUD in her cervix or with any pain with intercourse, or abnormal discharge.  If she starts having increased cramping with menses, she will restart using her ibuprofen 600-800mg po TID with food to decrease the risk of her expelling the IUD.  If she decides that she wants to attempt pregnancy in the future, she will schedule an appointment to have the IUD removed.   Reviewed that the IUD will need to be replaced for reasons of effectiveness in 10 years.    Ira Estes DO

## 2021-04-21 ENCOUNTER — OFFICE VISIT (OUTPATIENT)
Dept: FAMILY MEDICINE | Facility: CLINIC | Age: 31
End: 2021-04-21
Payer: COMMERCIAL

## 2021-04-21 ENCOUNTER — ANCILLARY PROCEDURE (OUTPATIENT)
Dept: GENERAL RADIOLOGY | Facility: CLINIC | Age: 31
End: 2021-04-21
Attending: FAMILY MEDICINE
Payer: COMMERCIAL

## 2021-04-21 VITALS
TEMPERATURE: 97.4 F | WEIGHT: 90.9 LBS | DIASTOLIC BLOOD PRESSURE: 60 MMHG | HEART RATE: 64 BPM | BODY MASS INDEX: 17.32 KG/M2 | SYSTOLIC BLOOD PRESSURE: 92 MMHG | OXYGEN SATURATION: 100 % | RESPIRATION RATE: 14 BRPM

## 2021-04-21 DIAGNOSIS — R10.84 ABDOMINAL PAIN, GENERALIZED: ICD-10-CM

## 2021-04-21 DIAGNOSIS — R10.84 ABDOMINAL PAIN, GENERALIZED: Primary | ICD-10-CM

## 2021-04-21 DIAGNOSIS — J31.0 CHRONIC RHINITIS: ICD-10-CM

## 2021-04-21 DIAGNOSIS — K59.09 CHRONIC CONSTIPATION: ICD-10-CM

## 2021-04-21 PROCEDURE — 99214 OFFICE O/P EST MOD 30 MIN: CPT | Performed by: FAMILY MEDICINE

## 2021-04-21 PROCEDURE — 74019 RADEX ABDOMEN 2 VIEWS: CPT | Mod: FY | Performed by: RADIOLOGY

## 2021-04-21 RX ORDER — CETIRIZINE HYDROCHLORIDE 10 MG/1
10 TABLET ORAL DAILY
Qty: 30 TABLET | Refills: 3 | Status: SHIPPED | OUTPATIENT
Start: 2021-04-21

## 2021-04-21 NOTE — PROGRESS NOTES
Assessment & Plan     Abdominal pain, generalized    Likely caused by ongoing chronic constipation  Recommended abdominal x-ray for further evaluation  Reviewed x-rays with patient showing moderate amount of stool in the colon  Encouraged to have a bowel program consisting of twice daily daily miralax for 3 days followed by once daily until she gets regular soft bowel movement everyday and then decreasing the frequency of miralax to 2-3 times weekly for maintenance. Reviewed fiber rich diet, fiber snacks, pushing fluids and regular exercises. RTCif no better in 2weeks or sooner prn.    - XR Abdomen 2 Views; Future    Chronic constipation  as above  The one episode of blood in stool could be from small anal fissure from constipation  If patient gets recurring episodes of blood in stool or new concerns for rectal bleeding, she understands to follow-up for further evaluation  - XR Abdomen 2 Views; Future    Review of the result(s) of each unique test - Abdominal x-rays         Chart documentation done in part with Dragon Voice recognition Software. Although reviewed after completion, some word and grammatical error may remain.    See Patient Instructions    No follow-ups on file.    Evette Storm MD  [facility]    Sasha Mix is a 31 year old who presents for the following health issues   Patient is here today with concerns of having ongoing intermittent generalized abdominal pain, colicky, with no associated concerns for nausea, vomiting, heartburn, diarrhea but does have history of chronic constipation that she gets 2-3 bowel movements per week, small, occasionally noticing streaks of blood in the stool.  Denies having mucus in the stool, weight loss, loss of appetite, rectal bleeding, fever, chills.  Denies UTI symptoms, abdominal bloating, history of celiac disease, underlying history of thyroid disorder.  HPI     Constipation  Onset/Duration:  ongoing  Description:  Frequency of bowel movements: 2-3 times per week  Consistency of stool: Hard  Progression of Symptoms: worsening  Accompanying signs and symptoms:    Abdominal pain: YES   Rectal pain: no   Blood in stool: YES-not currently   Nausea/Vomiting: no   Weight loss or gain: no  History:   Similar problems in past: no  History of abdominal surgery: no  Chronic laxative use: no  New medications: no  Precipitating or alleviating factors:   Therapies tried and outcome: nothing        Review of Systems   CONSTITUTIONAL: NEGATIVE for fever, chills, change in weight  RESP: NEGATIVE for significant cough or SOB  CV: NEGATIVE for chest pain, palpitations or peripheral edema  GI: as above  : normal menstrual cycles  ENDOCRINE: NEGATIVE for temperature intolerance, skin/hair changes  PSYCHIATRIC: NEGATIVE for changes in mood or affect      Objective    BP 92/60   Pulse 64   Temp 97.4  F (36.3  C) (Tympanic)   Resp 14   Wt 41.2 kg (90 lb 14.4 oz)   SpO2 100%   BMI 17.32 kg/m    Body mass index is 17.32 kg/m .  Physical Exam   GENERAL: healthy, alert and no distress  RESP: lungs clear to auscultation - no rales, rhonchi or wheezes  CV: regular rate and rhythm, normal S1 S2, no S3 or S4, no murmur, click or rub, no peripheral edema and peripheral pulses strong  ABDOMEN: soft, nontender, no hepatosplenomegaly, no masses and bowel sounds normal  BACK: no CVA tenderness, no paralumbar tenderness  PSYCH: mentation appears normal, affect normal/bright    Results for orders placed or performed in visit on 04/21/21   XR Abdomen 2 Views     Status: None    Narrative    ABDOMEN TWO VIEWS 4/21/2021 9:52 AM     HISTORY: Abdominal pain, generalized. Chronic constipation.    COMPARISON: None.      Impression    IMPRESSION: Nonobstructive gas pattern. Moderate volume retained  colonic stool. No free air or abnormal calcification. IUD noted.     KATELYN DEVRIES MD

## 2021-04-21 NOTE — PATIENT INSTRUCTIONS
Start on MIRALAX twice a day for 3 days followed by once daily for 2 weeks  And then take miralax 3-4 times a week  Patient Education     B?nh Táo Bón, Ng??i L?n [Constipation, Adult]  B?nh táo bón (constipation) là khi ??i ti?n c?a quý v? tr? nên ít th??ng xuyên h?n bình th??ng ho?c khi phân tr? nên r?t c?ng. ?i?u này có th? làm cho b?ng s?ng ho?c tr??ng lên. Nó c?ng có th? gây ?au ho?c khó ??i ti?n. B?nh táo bón th??ng do m?t ch? ?? ?n ít ch?t x?. M?t vài lo?i thu?c, ??c bi?t là thu?c ch?ng ?au, c?ng có th? gây nên b?nh táo bón. B?nh táo bón có th? ?i?u tr? b?ng dung d?ch b?m th?t, thu?c nhét h?u môn, thu?c wen?n tràng, ho?c thu?c làm m?m phân. Bác s? c?a quý v? s? ch? d?n cho quý v? cách nào là t?t nh?t cho quý v?. ?i?u karrie tr?ng là ph?i tuân dannie ch? d?n d??i ?ây ?? tránh v?n ?? này john t??ng martínez.    Ch?m Sóc T?i Leslie:  Thu?c: Dùng b?t c? thu?c nào nh? ?ã ???c ch? ??nh. Thu?c wen?n tràng an toàn n?u th?nh tho?ng m?i dùng. Có th? u?ng các lo?i thu?c khác ??u ??n. Hãy h?i bác s? ho?c d??c s? c?a quý v? n?u có th?c m?c.  Ch?m Sóc T?ng Quát:    Thu?c ch?ng ?au dannie toa c?ng có th? là nguyên nhân gây b?nh táo bón. N?u quý v? dùng thu?c ch?ng ?au dannie toa, hãy h?i bác s? xem quý v? có ph?i dùng thu?c làm m?m phân hay không.    M?t ch? ?? ?n shaheed?u ch?t x? v?i shaheed?u ch?t l?ng m?i ngày giúp almaz trì ??i ti?n ??u ??n và m?m. Nh?ng th?c ?n esther ?ây là nh?ng rigo?n th?c ?n có shaheed?u ch?t x? t?t:  ? Ng? c? và bánh mì: Ng? c?c nguyên h?t v?i cám, b?t y?n m?ch, y?n m?ch cán m?ng, bánh cám, bánh mì nguyên h?t  ? Trái cây: T?t c? các lo?i trái cây (t??i và s?y khô), nho khô, qu? m?n, m?, m?ng, qu? sung  ? Nayely: B?t c? các lo?i nayely t??i, ??c bi?t là ??u Hà Arnoldo, bông c?i xanh, c?i bruxen, bí, ??u xanh, c?i súp l?, ??u lima, cà r?t  ? Th? khác: B?p rang, g?o ??  U?ng shaheed?u n??c khi quý v? t?ng l??ng x? john th?c ?n.  Ti?p T?c Dannie Dõi V?i Bác S? C?a Quý V? Ho?c Tr? L?i C? S? Này N?u Các Tri?u Ch?ng Không ?? H?n John Vài Ngày T?i.  "Quý V? Có Th? C?n Làm Thêm Xét Melodie?m Ho?c Angel?n ??n M?t Bác S? Chuyên Larry V? ???ng Tiêu Hóa.  N?u x?y ra b?t c? ?i?u nào esther ?ây hãy L?P T?C ?I?U TR? Y T?:    S?t trên 100.4 F (38 C)    Không ?i ??i ti?n ???c    ?au b?ng ho?c ?au l?ng maged t?ng    Bu?n nôn, ói m?a    Ss?ng b?ng    Có máu john phân    Hernandez y?u, chóng m?t ho?c ng?t x?u    Âm ??o cunha?t maxi?t ??t ng?t    [id number] [employer] T?t c? các mark?n ???c b?o l?u. Thông tin này không nh?m thay th? cho d?ch v? ch?m sóc y t? avle tính chuyên môn. C?n luôn tuân kush s? ch? d?n t? chuyên maged ch?m sóc s?c whit? c?a quý v?.           Patient Education     ?n ch? ?? ?n có hàm l??ng s?i lake  S?i là ch?t t?o s?c b?n và hình thành c?u trúc cho th?c v?t. H?u h?t các lo?i h?t, ??u, nayely và qu? ??u ch?a s?i. Th?c ?n giàu ch?t s?i th??ng có hàm l??ng kris và ch?t béo th?p và naomi giúp quý v? th?y nhanh no h?n. Naomi c?ng có th? làm gi?m các r?i ro m?c ph?i nh?ng v?n ?? v? s?c kh?e nh?t ??nh. ?? bi?t hàm l??ng s?i john th?c ph?m ?óng h?p, ?óng marialuisa ho?c ?ông l?nh, hãy ??c nhãn \"Thông tin Juarez d??ng\". Nhãn này cho quý v? bi?t có marialuisa nhiêu s?i john m?t meade?t ?n.  Các lo?i s?i và ích l?i c?a chúng  Có trell lo?i s?i: không th? hòa tan và có th? hòa tan. Chúng ??u h? tr? tiêu hóa và giúp quý v? almaz trì cân n?ng lành m?nh.  S?i không th? hòa tan. Lo?i này ???c tìm th?y john ng? c?c nguyên h?t, ng? c?c, m?t s? lo?i trái cây và nayely qu? (nh? v? táo, b?p ngô, cà r?t). S?i không th? hòa tan có th? ng?n ng?a b?nh táo bón và gi?m r?i ro m?c m?t s? lo?i erin th?.  S?i có th? hòa tan. Lo?i s?i này có john y?n m?ch, ??u, m?t s? lo?i trái cây và nayely qu? (nh? dâu tây và ??u hà rai). S?i có th? hòa tan có th? gi?m l??ng cholesterol (giúp gi?m r?i ro m?c b?nh ruma) và giúp ki?m soát m?c ???ng maxi?t.  Tìm các th?c ?n có hàm l??ng s?i lake  Bánh mì ng? c?c nguyên h?t và ng? c?c. C? g?ng ?n 6-8 aox? (186 - 248g) m?i ngày. ??a lúa mì và ng? c?c y?n m?ch, cám, bánh n??ng x?p ho?c bánh mì " n??ng, và bánh ngô vào b?a ?n c?a quý v?.  Margaux qu?. C? g?ng ?n 2 c?c m?i ngày. Táo, cam, dâu tây, lê và chinchilla?i là các lo?i t?t. (L?u ý: N??c ép margaux qu? có hàm l??ng s?i th?p.)  [place]. C? g?ng ?n 3 c?c m?i ngày. B? sung m?ng tây, cà r?t, bông c?i xanh, ??u Hà Arnoldo và ngô vào b?a ?n c?a quý v?.  Các lo?i ??u (??u). M?t c?c ??u l?ng n?u chín cung c?p cho quý v? trên 15 g s?i. Th? dùng ??u navy, ??u l?ng và ??u xanh.  H?t. M?t n?m h?t cung c?p cho quý v? whit?ng 3 g s?i. Th? ?n h?t h??ng d??ng.  Dannie dõi hàm l??ng s?i c?a quý v?  M?t ch? ?? ?n kiêng lành m?nh g?m 31 g s?i m?i ngày n?u quý v? áp d?ng meade?t ?n 2.000 kris. Dannie dõi l??ng s?i mà quý v? ?n. Hãy b?t ??u b?ng cách ??c nhãn th?c ?n. Deepthi ?ó hãy ?n ?a d?ng hóa th?c ?n có hàm l??ng s?i lake. Hãy h?i bác s? c?a quý v? v? các s?n ph?m b? sung s?i.    [id number] [employer] T?t c? các mark?n ???c b?o l?u. Thông tin này không nh?m thay th? cho d?ch v? ch?m sóc y t? vale camp. C?n nora currie s? ch? d?n t? ceci lynne ch?m sóc s?c whit? c?a quý v?.

## 2021-06-09 ENCOUNTER — IMMUNIZATION (OUTPATIENT)
Dept: NURSING | Facility: CLINIC | Age: 31
End: 2021-06-09
Payer: COMMERCIAL

## 2021-06-09 PROCEDURE — 0001A PR COVID VAC PFIZER DIL RECON 30 MCG/0.3 ML IM: CPT

## 2021-06-09 PROCEDURE — 91300 PR COVID VAC PFIZER DIL RECON 30 MCG/0.3 ML IM: CPT

## 2021-07-01 ENCOUNTER — IMMUNIZATION (OUTPATIENT)
Dept: NURSING | Facility: CLINIC | Age: 31
End: 2021-07-01
Attending: INTERNAL MEDICINE
Payer: COMMERCIAL

## 2021-07-01 PROCEDURE — 0002A PR COVID VAC PFIZER DIL RECON 30 MCG/0.3 ML IM: CPT

## 2021-07-01 PROCEDURE — 91300 PR COVID VAC PFIZER DIL RECON 30 MCG/0.3 ML IM: CPT

## 2021-10-03 ENCOUNTER — HEALTH MAINTENANCE LETTER (OUTPATIENT)
Age: 31
End: 2021-10-03

## 2022-01-23 ENCOUNTER — HEALTH MAINTENANCE LETTER (OUTPATIENT)
Age: 32
End: 2022-01-23

## 2022-04-07 ENCOUNTER — TELEPHONE (OUTPATIENT)
Dept: FAMILY MEDICINE | Facility: CLINIC | Age: 32
End: 2022-04-07
Payer: COMMERCIAL

## 2022-04-07 NOTE — TELEPHONE ENCOUNTER
Attempted to reach patient to reschedule appointment on 7/22/22 with Dr. Storm, she is not in the office that day.  No answer and no machine, unable to leave a message

## 2022-04-21 ENCOUNTER — TELEPHONE (OUTPATIENT)
Dept: FAMILY MEDICINE | Facility: CLINIC | Age: 32
End: 2022-04-21
Payer: COMMERCIAL

## 2022-04-21 NOTE — TELEPHONE ENCOUNTER
Via  services, left message for patient to return call to reschedule appointment with Dr. Storm, she is out of the office

## 2022-09-04 ENCOUNTER — HEALTH MAINTENANCE LETTER (OUTPATIENT)
Age: 32
End: 2022-09-04

## 2023-04-30 ENCOUNTER — HEALTH MAINTENANCE LETTER (OUTPATIENT)
Age: 33
End: 2023-04-30

## 2024-07-07 ENCOUNTER — HEALTH MAINTENANCE LETTER (OUTPATIENT)
Age: 34
End: 2024-07-07